# Patient Record
Sex: FEMALE | Race: BLACK OR AFRICAN AMERICAN | NOT HISPANIC OR LATINO | Employment: PART TIME | URBAN - METROPOLITAN AREA
[De-identification: names, ages, dates, MRNs, and addresses within clinical notes are randomized per-mention and may not be internally consistent; named-entity substitution may affect disease eponyms.]

---
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
## 2019-01-03 ENCOUNTER — ANESTHESIA EVENT (OUTPATIENT)
Dept: OBSTETRICS AND GYNECOLOGY | Facility: OTHER | Age: 19
End: 2019-01-03
Payer: MEDICAID

## 2019-01-03 ENCOUNTER — ROUTINE PRENATAL (OUTPATIENT)
Dept: OBSTETRICS AND GYNECOLOGY | Facility: CLINIC | Age: 19
End: 2019-01-03
Attending: OBSTETRICS & GYNECOLOGY
Payer: MEDICAID

## 2019-01-03 ENCOUNTER — HOSPITAL ENCOUNTER (INPATIENT)
Facility: OTHER | Age: 19
LOS: 3 days | Discharge: HOME | End: 2019-01-06
Attending: OBSTETRICS & GYNECOLOGY | Admitting: OBSTETRICS & GYNECOLOGY
Payer: MEDICAID

## 2019-01-03 ENCOUNTER — ANESTHESIA (OUTPATIENT)
Dept: OBSTETRICS AND GYNECOLOGY | Facility: OTHER | Age: 19
End: 2019-01-03
Payer: MEDICAID

## 2019-01-03 VITALS
DIASTOLIC BLOOD PRESSURE: 80 MMHG | SYSTOLIC BLOOD PRESSURE: 118 MMHG | BODY MASS INDEX: 25.28 KG/M2 | WEIGHT: 138.25 LBS

## 2019-01-03 DIAGNOSIS — B00.9 HSV-2 (HERPES SIMPLEX VIRUS 2) INFECTION: ICD-10-CM

## 2019-01-03 DIAGNOSIS — Z34.90 ENCOUNTER FOR INDUCTION OF LABOR: ICD-10-CM

## 2019-01-03 DIAGNOSIS — J45.909 ASTHMA, UNSPECIFIED ASTHMA SEVERITY, UNSPECIFIED WHETHER COMPLICATED, UNSPECIFIED WHETHER PERSISTENT: Primary | ICD-10-CM

## 2019-01-03 DIAGNOSIS — O99.013 ANEMIA AFFECTING PREGNANCY IN THIRD TRIMESTER: ICD-10-CM

## 2019-01-03 DIAGNOSIS — Z34.90 PREGNANCY WITH ONE FETUS, ANTEPARTUM: ICD-10-CM

## 2019-01-03 LAB
ABO + RH BLD: NORMAL
BASOPHILS # BLD AUTO: 0 K/UL
BASOPHILS NFR BLD: 0 %
BLD GP AB SCN CELLS X3 SERPL QL: NORMAL
DIFFERENTIAL METHOD: ABNORMAL
EOSINOPHIL # BLD AUTO: 0.1 K/UL
EOSINOPHIL NFR BLD: 0.8 %
ERYTHROCYTE [DISTWIDTH] IN BLOOD BY AUTOMATED COUNT: 16.5 %
HCT VFR BLD AUTO: 29.5 %
HGB BLD-MCNC: 9.5 G/DL
LYMPHOCYTES # BLD AUTO: 1.6 K/UL
LYMPHOCYTES NFR BLD: 26.3 %
MCH RBC QN AUTO: 26.5 PG
MCHC RBC AUTO-ENTMCNC: 32.2 G/DL
MCV RBC AUTO: 82 FL
MONOCYTES # BLD AUTO: 0.4 K/UL
MONOCYTES NFR BLD: 6.4 %
NEUTROPHILS # BLD AUTO: 4 K/UL
NEUTROPHILS NFR BLD: 66.2 %
PLATELET # BLD AUTO: 164 K/UL
PMV BLD AUTO: 10.9 FL
RBC # BLD AUTO: 3.58 M/UL
WBC # BLD AUTO: 6.09 K/UL

## 2019-01-03 PROCEDURE — 36415 COLL VENOUS BLD VENIPUNCTURE: CPT

## 2019-01-03 PROCEDURE — 99213 OFFICE O/P EST LOW 20 MIN: CPT | Mod: PBBFAC,TH,25 | Performed by: OBSTETRICS & GYNECOLOGY

## 2019-01-03 PROCEDURE — 99213 OFFICE O/P EST LOW 20 MIN: CPT | Mod: TH,S$PBB,, | Performed by: OBSTETRICS & GYNECOLOGY

## 2019-01-03 PROCEDURE — 86901 BLOOD TYPING SEROLOGIC RH(D): CPT

## 2019-01-03 PROCEDURE — 11000001 HC ACUTE MED/SURG PRIVATE ROOM

## 2019-01-03 PROCEDURE — 25000003 PHARM REV CODE 250: Performed by: STUDENT IN AN ORGANIZED HEALTH CARE EDUCATION/TRAINING PROGRAM

## 2019-01-03 PROCEDURE — 99999 PR PBB SHADOW E&M-EST. PATIENT-LVL III: ICD-10-PCS | Mod: PBBFAC,,, | Performed by: OBSTETRICS & GYNECOLOGY

## 2019-01-03 PROCEDURE — 85025 COMPLETE CBC W/AUTO DIFF WBC: CPT

## 2019-01-03 PROCEDURE — 99999 PR PBB SHADOW E&M-EST. PATIENT-LVL III: CPT | Mod: PBBFAC,,, | Performed by: OBSTETRICS & GYNECOLOGY

## 2019-01-03 PROCEDURE — 72100002 HC LABOR CARE, 1ST 8 HOURS

## 2019-01-03 PROCEDURE — 99213 PR OFFICE/OUTPT VISIT, EST, LEVL III, 20-29 MIN: ICD-10-PCS | Mod: TH,S$PBB,, | Performed by: OBSTETRICS & GYNECOLOGY

## 2019-01-03 RX ORDER — SODIUM CHLORIDE, SODIUM LACTATE, POTASSIUM CHLORIDE, CALCIUM CHLORIDE 600; 310; 30; 20 MG/100ML; MG/100ML; MG/100ML; MG/100ML
INJECTION, SOLUTION INTRAVENOUS CONTINUOUS
Status: DISCONTINUED | OUTPATIENT
Start: 2019-01-03 | End: 2019-01-04

## 2019-01-03 RX ORDER — OXYTOCIN/RINGER'S LACTATE 20/1000 ML
333 PLASTIC BAG, INJECTION (ML) INTRAVENOUS CONTINUOUS
Status: ACTIVE | OUTPATIENT
Start: 2019-01-03 | End: 2019-01-03

## 2019-01-03 RX ORDER — CARBOPROST TROMETHAMINE 250 UG/ML
250 INJECTION, SOLUTION INTRAMUSCULAR
Status: DISCONTINUED | OUTPATIENT
Start: 2019-01-03 | End: 2019-01-04

## 2019-01-03 RX ORDER — METHYLERGONOVINE MALEATE 0.2 MG/ML
200 INJECTION INTRAVENOUS
Status: DISCONTINUED | OUTPATIENT
Start: 2019-01-03 | End: 2019-01-04

## 2019-01-03 RX ORDER — CARBOPROST TROMETHAMINE 250 UG/ML
250 INJECTION, SOLUTION INTRAMUSCULAR
Status: CANCELLED | OUTPATIENT
Start: 2019-01-03

## 2019-01-03 RX ORDER — OXYTOCIN/RINGER'S LACTATE 20/1000 ML
10 PLASTIC BAG, INJECTION (ML) INTRAVENOUS
Status: DISCONTINUED | OUTPATIENT
Start: 2019-01-03 | End: 2019-01-04

## 2019-01-03 RX ORDER — SODIUM CHLORIDE 9 MG/ML
INJECTION, SOLUTION INTRAVENOUS
Status: DISCONTINUED | OUTPATIENT
Start: 2019-01-03 | End: 2019-01-04

## 2019-01-03 RX ORDER — ONDANSETRON 4 MG/1
8 TABLET, ORALLY DISINTEGRATING ORAL EVERY 8 HOURS PRN
Status: CANCELLED | OUTPATIENT
Start: 2019-01-03

## 2019-01-03 RX ORDER — MISOPROSTOL 100 UG/1
600 TABLET ORAL
Status: CANCELLED | OUTPATIENT
Start: 2019-01-03

## 2019-01-03 RX ORDER — SODIUM CHLORIDE, SODIUM LACTATE, POTASSIUM CHLORIDE, CALCIUM CHLORIDE 600; 310; 30; 20 MG/100ML; MG/100ML; MG/100ML; MG/100ML
INJECTION, SOLUTION INTRAVENOUS CONTINUOUS
Status: CANCELLED | OUTPATIENT
Start: 2019-01-03

## 2019-01-03 RX ORDER — OXYTOCIN/RINGER'S LACTATE 20/1000 ML
10 PLASTIC BAG, INJECTION (ML) INTRAVENOUS
Status: CANCELLED | OUTPATIENT
Start: 2019-01-03

## 2019-01-03 RX ORDER — OXYTOCIN/RINGER'S LACTATE 20/1000 ML
20 PLASTIC BAG, INJECTION (ML) INTRAVENOUS ONCE
Status: CANCELLED | OUTPATIENT
Start: 2019-01-03 | End: 2019-01-03

## 2019-01-03 RX ORDER — ONDANSETRON 8 MG/1
8 TABLET, ORALLY DISINTEGRATING ORAL EVERY 8 HOURS PRN
Status: DISCONTINUED | OUTPATIENT
Start: 2019-01-03 | End: 2019-01-04

## 2019-01-03 RX ORDER — METHYLERGONOVINE MALEATE 0.2 MG/ML
200 INJECTION INTRAVENOUS
Status: CANCELLED | OUTPATIENT
Start: 2019-01-03

## 2019-01-03 RX ORDER — OXYTOCIN/RINGER'S LACTATE 20/1000 ML
41.65 PLASTIC BAG, INJECTION (ML) INTRAVENOUS CONTINUOUS
Status: DISCONTINUED | OUTPATIENT
Start: 2019-01-03 | End: 2019-01-04

## 2019-01-03 RX ORDER — OXYTOCIN/RINGER'S LACTATE 20/1000 ML
41.7 PLASTIC BAG, INJECTION (ML) INTRAVENOUS CONTINUOUS
Status: DISCONTINUED | OUTPATIENT
Start: 2019-01-03 | End: 2019-01-04

## 2019-01-03 RX ORDER — MISOPROSTOL 100 MCG
50 TABLET ORAL ONCE
Status: CANCELLED | OUTPATIENT
Start: 2019-01-03

## 2019-01-03 RX ORDER — OXYTOCIN/RINGER'S LACTATE 20/1000 ML
20 PLASTIC BAG, INJECTION (ML) INTRAVENOUS ONCE
Status: DISCONTINUED | OUTPATIENT
Start: 2019-01-03 | End: 2019-01-04

## 2019-01-03 RX ORDER — SODIUM CHLORIDE 9 MG/ML
INJECTION, SOLUTION INTRAVENOUS
Status: CANCELLED | OUTPATIENT
Start: 2019-01-03

## 2019-01-03 RX ORDER — TERBUTALINE SULFATE 1 MG/ML
0.25 INJECTION SUBCUTANEOUS
Status: DISCONTINUED | OUTPATIENT
Start: 2019-01-03 | End: 2019-01-04

## 2019-01-03 RX ORDER — TERBUTALINE SULFATE 1 MG/ML
0.25 INJECTION SUBCUTANEOUS
Status: CANCELLED | OUTPATIENT
Start: 2019-01-03

## 2019-01-03 RX ORDER — OXYTOCIN/RINGER'S LACTATE 20/1000 ML
2 PLASTIC BAG, INJECTION (ML) INTRAVENOUS CONTINUOUS
Status: DISCONTINUED | OUTPATIENT
Start: 2019-01-03 | End: 2019-01-04

## 2019-01-03 RX ORDER — MISOPROSTOL 200 UG/1
600 TABLET ORAL
Status: DISCONTINUED | OUTPATIENT
Start: 2019-01-03 | End: 2019-01-04

## 2019-01-03 RX ADMIN — SODIUM CHLORIDE, SODIUM LACTATE, POTASSIUM CHLORIDE, AND CALCIUM CHLORIDE: .6; .31; .03; .02 INJECTION, SOLUTION INTRAVENOUS at 10:01

## 2019-01-03 RX ADMIN — MISOPROSTOL 50 MCG: 100 TABLET ORAL at 10:01

## 2019-01-03 NOTE — PROGRESS NOTES
Patient seen and examined.  No complaints, denies VB/LOF/ reoprts occasional Ctxns, reports good fetal movement. Precautions for Bleeding/ ROM/ Decreased fetal movement/ Pre-E reviewed.  Would like to proceed with induction, will come in tonight at 9pm, a;; instructions reviewed.

## 2019-01-04 LAB
ALBUMIN SERPL BCP-MCNC: 2.8 G/DL
ALP SERPL-CCNC: 221 U/L
ALT SERPL W/O P-5'-P-CCNC: 31 U/L
ANION GAP SERPL CALC-SCNC: 9 MMOL/L
AST SERPL-CCNC: 28 U/L
BASOPHILS # BLD AUTO: 0 K/UL
BASOPHILS NFR BLD: 0 %
BILIRUB SERPL-MCNC: 0.2 MG/DL
BUN SERPL-MCNC: 9 MG/DL
CALCIUM SERPL-MCNC: 9 MG/DL
CHLORIDE SERPL-SCNC: 106 MMOL/L
CO2 SERPL-SCNC: 22 MMOL/L
CREAT SERPL-MCNC: 0.6 MG/DL
CREAT UR-MCNC: 42.1 MG/DL
DIFFERENTIAL METHOD: ABNORMAL
EOSINOPHIL # BLD AUTO: 0.1 K/UL
EOSINOPHIL NFR BLD: 1.2 %
ERYTHROCYTE [DISTWIDTH] IN BLOOD BY AUTOMATED COUNT: 16.7 %
EST. GFR  (AFRICAN AMERICAN): >60 ML/MIN/1.73 M^2
EST. GFR  (NON AFRICAN AMERICAN): >60 ML/MIN/1.73 M^2
GLUCOSE SERPL-MCNC: 75 MG/DL
HCT VFR BLD AUTO: 32.9 %
HGB BLD-MCNC: 10.2 G/DL
LYMPHOCYTES # BLD AUTO: 2.6 K/UL
LYMPHOCYTES NFR BLD: 38.3 %
MCH RBC QN AUTO: 25.8 PG
MCHC RBC AUTO-ENTMCNC: 31 G/DL
MCV RBC AUTO: 83 FL
MONOCYTES # BLD AUTO: 0.5 K/UL
MONOCYTES NFR BLD: 6.6 %
NEUTROPHILS # BLD AUTO: 3.6 K/UL
NEUTROPHILS NFR BLD: 53.6 %
PLATELET # BLD AUTO: 172 K/UL
PMV BLD AUTO: 10.8 FL
POTASSIUM SERPL-SCNC: 4 MMOL/L
PROT SERPL-MCNC: 6.4 G/DL
PROT UR-MCNC: 55 MG/DL
PROT/CREAT UR: 1.31 MG/G{CREAT}
RBC # BLD AUTO: 3.95 M/UL
SODIUM SERPL-SCNC: 137 MMOL/L
WBC # BLD AUTO: 6.79 K/UL

## 2019-01-04 PROCEDURE — 72100003 HC LABOR CARE, EA. ADDL. 8 HRS

## 2019-01-04 PROCEDURE — 25000003 PHARM REV CODE 250: Performed by: STUDENT IN AN ORGANIZED HEALTH CARE EDUCATION/TRAINING PROGRAM

## 2019-01-04 PROCEDURE — 59409 PR OBSTETRICAL CARE,VAG DELIV ONLY: ICD-10-PCS | Mod: GB,,, | Performed by: OBSTETRICS & GYNECOLOGY

## 2019-01-04 PROCEDURE — 80053 COMPREHEN METABOLIC PANEL: CPT

## 2019-01-04 PROCEDURE — 51702 INSERT TEMP BLADDER CATH: CPT

## 2019-01-04 PROCEDURE — 85025 COMPLETE CBC W/AUTO DIFF WBC: CPT

## 2019-01-04 PROCEDURE — 62326 NJX INTERLAMINAR LMBR/SAC: CPT | Performed by: STUDENT IN AN ORGANIZED HEALTH CARE EDUCATION/TRAINING PROGRAM

## 2019-01-04 PROCEDURE — 27200710 HC EPIDURAL INFUSION PUMP SET: Performed by: STUDENT IN AN ORGANIZED HEALTH CARE EDUCATION/TRAINING PROGRAM

## 2019-01-04 PROCEDURE — 59409 OBSTETRICAL CARE: CPT | Mod: GB,,, | Performed by: OBSTETRICS & GYNECOLOGY

## 2019-01-04 PROCEDURE — 84156 ASSAY OF PROTEIN URINE: CPT

## 2019-01-04 PROCEDURE — 63600175 PHARM REV CODE 636 W HCPCS: Performed by: STUDENT IN AN ORGANIZED HEALTH CARE EDUCATION/TRAINING PROGRAM

## 2019-01-04 PROCEDURE — 72100002 HC LABOR CARE, 1ST 8 HOURS

## 2019-01-04 PROCEDURE — 72200006 HC VAGINAL DELIVERY LEVEL III

## 2019-01-04 PROCEDURE — 36415 COLL VENOUS BLD VENIPUNCTURE: CPT

## 2019-01-04 PROCEDURE — 27800517 HC TRAY,EPIDURAL-CONTINUOUS: Performed by: STUDENT IN AN ORGANIZED HEALTH CARE EDUCATION/TRAINING PROGRAM

## 2019-01-04 PROCEDURE — 11000001 HC ACUTE MED/SURG PRIVATE ROOM

## 2019-01-04 PROCEDURE — S0020 INJECTION, BUPIVICAINE HYDRO: HCPCS | Performed by: STUDENT IN AN ORGANIZED HEALTH CARE EDUCATION/TRAINING PROGRAM

## 2019-01-04 PROCEDURE — 62326 NJX INTERLAMINAR LMBR/SAC: CPT | Performed by: ANESTHESIOLOGY

## 2019-01-04 PROCEDURE — 59409 OBSTETRICAL CARE: CPT | Mod: AA,,, | Performed by: ANESTHESIOLOGY

## 2019-01-04 PROCEDURE — 59409 PRA ETRICAL CARE,VAG DELIV ONLY: ICD-10-PCS | Mod: AA,,, | Performed by: ANESTHESIOLOGY

## 2019-01-04 RX ORDER — HYDROCORTISONE 25 MG/G
CREAM TOPICAL 3 TIMES DAILY PRN
Status: DISCONTINUED | OUTPATIENT
Start: 2019-01-04 | End: 2019-01-06 | Stop reason: HOSPADM

## 2019-01-04 RX ORDER — LIDOCAINE HYDROCHLORIDE 10 MG/ML
INJECTION INFILTRATION; PERINEURAL
Status: DISCONTINUED
Start: 2019-01-04 | End: 2019-01-04 | Stop reason: WASHOUT

## 2019-01-04 RX ORDER — OXYTOCIN/RINGER'S LACTATE 20/1000 ML
41.65 PLASTIC BAG, INJECTION (ML) INTRAVENOUS CONTINUOUS
Status: ACTIVE | OUTPATIENT
Start: 2019-01-04 | End: 2019-01-05

## 2019-01-04 RX ORDER — ACETAMINOPHEN 325 MG/1
650 TABLET ORAL EVERY 6 HOURS PRN
Status: DISCONTINUED | OUTPATIENT
Start: 2019-01-04 | End: 2019-01-06 | Stop reason: HOSPADM

## 2019-01-04 RX ORDER — NALBUPHINE HYDROCHLORIDE 10 MG/ML
2.5 INJECTION, SOLUTION INTRAMUSCULAR; INTRAVENOUS; SUBCUTANEOUS ONCE
Status: DISCONTINUED | OUTPATIENT
Start: 2019-01-04 | End: 2019-01-04

## 2019-01-04 RX ORDER — CALCIUM GLUCONATE 98 MG/ML
1 INJECTION, SOLUTION INTRAVENOUS
Status: DISCONTINUED | OUTPATIENT
Start: 2019-01-04 | End: 2019-01-06 | Stop reason: HOSPADM

## 2019-01-04 RX ORDER — FENTANYL/BUPIVACAINE/NS/PF 2MCG/ML-.1
PLASTIC BAG, INJECTION (ML) INJECTION CONTINUOUS
Status: DISCONTINUED | OUTPATIENT
Start: 2019-01-04 | End: 2019-01-04

## 2019-01-04 RX ORDER — DOCUSATE SODIUM 100 MG/1
200 CAPSULE, LIQUID FILLED ORAL 2 TIMES DAILY PRN
Status: DISCONTINUED | OUTPATIENT
Start: 2019-01-04 | End: 2019-01-06

## 2019-01-04 RX ORDER — FENTANYL CITRATE 50 UG/ML
INJECTION, SOLUTION INTRAMUSCULAR; INTRAVENOUS
Status: DISPENSED
Start: 2019-01-04 | End: 2019-01-05

## 2019-01-04 RX ORDER — HYDROCODONE BITARTRATE AND ACETAMINOPHEN 5; 325 MG/1; MG/1
1 TABLET ORAL EVERY 4 HOURS PRN
Status: DISCONTINUED | OUTPATIENT
Start: 2019-01-04 | End: 2019-01-06 | Stop reason: HOSPADM

## 2019-01-04 RX ORDER — LABETALOL HCL 20 MG/4 ML
20 SYRINGE (ML) INTRAVENOUS ONCE
Status: COMPLETED | OUTPATIENT
Start: 2019-01-04 | End: 2019-01-04

## 2019-01-04 RX ORDER — IBUPROFEN 600 MG/1
600 TABLET ORAL EVERY 6 HOURS
Status: DISCONTINUED | OUTPATIENT
Start: 2019-01-05 | End: 2019-01-06 | Stop reason: HOSPADM

## 2019-01-04 RX ORDER — BUPIVACAINE HYDROCHLORIDE 2.5 MG/ML
INJECTION, SOLUTION INFILTRATION; PERINEURAL CONTINUOUS PRN
Status: DISCONTINUED | OUTPATIENT
Start: 2019-01-04 | End: 2019-01-04

## 2019-01-04 RX ORDER — SODIUM CHLORIDE, SODIUM LACTATE, POTASSIUM CHLORIDE, CALCIUM CHLORIDE 600; 310; 30; 20 MG/100ML; MG/100ML; MG/100ML; MG/100ML
1000 INJECTION, SOLUTION INTRAVENOUS CONTINUOUS
Status: DISCONTINUED | OUTPATIENT
Start: 2019-01-04 | End: 2019-01-05

## 2019-01-04 RX ORDER — ONDANSETRON 8 MG/1
8 TABLET, ORALLY DISINTEGRATING ORAL EVERY 8 HOURS PRN
Status: DISCONTINUED | OUTPATIENT
Start: 2019-01-04 | End: 2019-01-06 | Stop reason: HOSPADM

## 2019-01-04 RX ORDER — SODIUM CITRATE AND CITRIC ACID MONOHYDRATE 334; 500 MG/5ML; MG/5ML
30 SOLUTION ORAL ONCE
Status: DISCONTINUED | OUTPATIENT
Start: 2019-01-04 | End: 2019-01-04

## 2019-01-04 RX ORDER — FENTANYL/BUPIVACAINE/NS/PF 2MCG/ML-.1
PLASTIC BAG, INJECTION (ML) INJECTION
Status: DISPENSED
Start: 2019-01-04 | End: 2019-01-04

## 2019-01-04 RX ORDER — DIPHENHYDRAMINE HCL 25 MG
25 CAPSULE ORAL EVERY 4 HOURS PRN
Status: DISCONTINUED | OUTPATIENT
Start: 2019-01-04 | End: 2019-01-06 | Stop reason: HOSPADM

## 2019-01-04 RX ORDER — MAGNESIUM SULFATE HEPTAHYDRATE 40 MG/ML
4 INJECTION, SOLUTION INTRAVENOUS ONCE
Status: COMPLETED | OUTPATIENT
Start: 2019-01-04 | End: 2019-01-04

## 2019-01-04 RX ORDER — HYDROCODONE BITARTRATE AND ACETAMINOPHEN 10; 325 MG/1; MG/1
1 TABLET ORAL EVERY 4 HOURS PRN
Status: DISCONTINUED | OUTPATIENT
Start: 2019-01-04 | End: 2019-01-06 | Stop reason: HOSPADM

## 2019-01-04 RX ORDER — ONDANSETRON 2 MG/ML
4 INJECTION INTRAMUSCULAR; INTRAVENOUS ONCE
Status: DISCONTINUED | OUTPATIENT
Start: 2019-01-04 | End: 2019-01-04

## 2019-01-04 RX ORDER — FAMOTIDINE 10 MG/ML
20 INJECTION INTRAVENOUS ONCE
Status: DISCONTINUED | OUTPATIENT
Start: 2019-01-04 | End: 2019-01-04

## 2019-01-04 RX ORDER — DIPHENHYDRAMINE HYDROCHLORIDE 50 MG/ML
25 INJECTION INTRAMUSCULAR; INTRAVENOUS EVERY 4 HOURS PRN
Status: DISCONTINUED | OUTPATIENT
Start: 2019-01-04 | End: 2019-01-04

## 2019-01-04 RX ORDER — MAGNESIUM SULFATE HEPTAHYDRATE 40 MG/ML
2 INJECTION, SOLUTION INTRAVENOUS CONTINUOUS
Status: DISCONTINUED | OUTPATIENT
Start: 2019-01-04 | End: 2019-01-05

## 2019-01-04 RX ORDER — DIPHENHYDRAMINE HYDROCHLORIDE 50 MG/ML
12.5 INJECTION INTRAMUSCULAR; INTRAVENOUS EVERY 4 HOURS PRN
Status: DISCONTINUED | OUTPATIENT
Start: 2019-01-04 | End: 2019-01-04

## 2019-01-04 RX ORDER — BUPIVACAINE HYDROCHLORIDE 2.5 MG/ML
INJECTION, SOLUTION EPIDURAL; INFILTRATION; INTRACAUDAL
Status: DISPENSED
Start: 2019-01-04 | End: 2019-01-04

## 2019-01-04 RX ADMIN — LABETALOL HYDROCHLORIDE 20 MG: 5 INJECTION, SOLUTION INTRAVENOUS at 05:01

## 2019-01-04 RX ADMIN — MAGNESIUM SULFATE HEPTAHYDRATE 2 G/HR: 40 INJECTION, SOLUTION INTRAVENOUS at 11:01

## 2019-01-04 RX ADMIN — MAGNESIUM SULFATE HEPTAHYDRATE 4 G: 40 INJECTION, SOLUTION INTRAVENOUS at 05:01

## 2019-01-04 RX ADMIN — Medication 2 MILLI-UNITS/MIN: at 02:01

## 2019-01-04 RX ADMIN — MAGNESIUM SULFATE HEPTAHYDRATE 2 G/HR: 40 INJECTION, SOLUTION INTRAVENOUS at 05:01

## 2019-01-04 RX ADMIN — BUPIVACAINE HYDROCHLORIDE 5 ML/HR: 2.5 INJECTION, SOLUTION INFILTRATION; PERINEURAL at 08:01

## 2019-01-04 RX ADMIN — IBUPROFEN 600 MG: 600 TABLET ORAL at 11:01

## 2019-01-04 NOTE — PROGRESS NOTES
"LABOR PROGRESS NOTE/MAG NOTE    S: 18 y.o.  at 39w6d with EDC of 2019, by Last Menstrual Period, admitted for elective IOL however at 5AM developed severe range BP. She was diagnosed with preE w/ SF (BP) and started on magnesium. BP came down to normal range with labetalol 20 mg x1. Asymptomatic. She is continued on magnesium for seizure ppx. Labs obtained, as below.    Comfortable with epidural. Denies HA, vision changes, SOB, RUQ pain.    O:   /68   Pulse 92   Temp 98.2 °F (36.8 °C) (Temporal)   Resp 18   Ht 5' 2" (1.575 m)   Wt 62.7 kg (138 lb 3.7 oz)   LMP 2018   SpO2 98%   Breastfeeding? No   BMI 25.28 kg/m²     Gen: NAD, A&O x3  Resp: CTAB  Ext: TEDs/SCDs in place, DTRs 2+  FHT: 120, mod BTBV, + accels, - decels, Cat 1, reassuring  [place]/IUPC: Q2-3 min  SVE: 4/80/-2  IUPC placed    Recent Labs   Lab 19  2121 19  0440   WBC 6.09 6.79   HGB 9.5* 10.2*   HCT 29.5* 32.9*   MCV 82 83    172      Recent Labs   Lab 19  0440 19  0502     --    K 4.0  --      --    CO2 22*  --    BUN 9  --    CREATININE 0.6  --    GLU 75  --    PROT 6.4  --    BILITOT 0.2  --    ALKPHOS 221*  --    ALT 31  --    AST 28  --    UTPCR  --  1.31*          ASSESSMENT:  18 y.o.  at 39w6d with  Active Hospital Problems    Diagnosis  POA    *Encounter for induction of labor [Z34.90]  Not Applicable    Asthma- childhood [J45.909]  Yes    HSV-2 (herpes simplex virus 2) infection [B00.9]  Yes      Resolved Hospital Problems   No resolved problems to display.       PLAN:  Labor induction  Continue Close Maternal/Fetal Monitoring  Continue pitocin augmentation per protocol, titrate for goal MVUs >200  Recheck 2 hours or PRN    Labor course:   1000: 2/50/-3, cytotec x1  0200: 2/50/-2, pitocin at 2 mU/min  0430: 4/80/-2, AROM cl, pit at 2  0630: 4/80/-2, pit at 8 mU/min, IUPC placed    PreE w/ SF  S/p labetalol 20 mg x1, BP now normal  Asymptomatic  Continue magnesium " sulfate for seizure ppx  UOP adequate, 225 ml in past hr  Close monitoring of BP and UOP    Tyra Reynolds MD  OBGYN - PGY 4

## 2019-01-04 NOTE — PROGRESS NOTES
"LABOR PROGRESS NOTE/MAG NOTE    S: 18 y.o.  at 39w6d with EDC of 2019, by Last Menstrual Period, admitted for elective IOL however at 5AM developed severe range BP. She was diagnosed with preE w/ SF (BP) and started on magnesium. Patient is continued on magnesium at this time.     Comfortable with epidural. Denies HA, vision changes, SOB, RUQ pain.    O:   /68   Pulse 92   Temp 98.2 °F (36.8 °C) (Temporal)   Resp 18   Ht 5' 2" (1.575 m)   Wt 62.7 kg (138 lb 3.7 oz)   LMP 2018   SpO2 98%   Breastfeeding? No   BMI 25.28 kg/m²     Gen: NAD, A&O x3  Resp: CTAB  Ext: TEDs/SCDs in place, DTRs 2+   FHT: 120, mod BTBV, + accels, + intermittent variable decels, Cat 2, reassuring  [place]/IUPC: Q2-3 min  SVE: Anterior lip/100/0  IUPC in place    Recent Labs   Lab 19  0440   WBC 6.09 6.79   HGB 9.5* 10.2*   HCT 29.5* 32.9*   MCV 82 83    172      Recent Labs   Lab 19  0440 19  0502     --    K 4.0  --      --    CO2 22*  --    BUN 9  --    CREATININE 0.6  --    GLU 75  --    PROT 6.4  --    BILITOT 0.2  --    ALKPHOS 221*  --    ALT 31  --    AST 28  --    UTPCR  --  1.31*          ASSESSMENT:  18 y.o.  at 39w6d with  Active Hospital Problems    Diagnosis  POA    *Encounter for induction of labor [Z34.90]  Not Applicable    Asthma- childhood [J45.909]  Yes    HSV-2 (herpes simplex virus 2) infection [B00.9]  Yes      Resolved Hospital Problems   No resolved problems to display.       PLAN:  Labor induction  Continue Close Maternal/Fetal Monitoring  Continue pitocin augmentation per protocol, titrate for goal MVUs >200  Recheck 1 hour or PRN    Labor course:   1000: 2/50/-3, cytotec x1  0200: 2/50/-2, pitocin at 2 mU/min  0430: 4/80/-2, AROM cl, pit at 2  0630: 4/80/-2, pit at 8 mU/min, IUPC placed  1000: 6/90-1, pit at 8  1200: 7/90/-1, pit at 14, MVUs 168  1430: Anterior lip/100/0, labor down     PreE w/ SF  S/p labetalol 20 mg x1, BP " now normal  BP: (118-174)/(64-98) 124/68  Asymptomatic  Continue magnesium sulfate for seizure ppx  UOP adequate, 200 ml in past hr  Close monitoring of BP and UOP    Yolanda Green MD  OBGYN PGY-1

## 2019-01-04 NOTE — H&P
HISTORY AND PHYSICAL                                                OBSTETRICS          Subjective:       Isabela Frankel is a 18 y.o.  female with IUP at 39w5d weeks gestation here for elective induction of labor.    Patient denies contractions, denies vaginal bleeding, denies LOF.   Fetal Movement: normal.    This IUP is complicated by:  HSV: on valtrex ppx since 36 weeks  Childhood asthma: no current meds  Anemia: admit H/h 9.5/29.5/164  Adolescent pregnancy: 17 yoa today    Review of Systems   Constitutional: Negative for activity change, chills and fatigue.   Eyes: Negative for visual disturbance.   Respiratory: Negative for shortness of breath.    Cardiovascular: Negative for chest pain and palpitations.   Gastrointestinal: Negative for abdominal pain, constipation, diarrhea, nausea and vomiting.   Genitourinary: Negative for dysuria, vaginal bleeding, vaginal discharge, vaginal pain and vaginal odor.   Musculoskeletal: Negative for myalgias.   Neurological: Negative for headaches.   Psychiatric/Behavioral: Negative for depression.     PMHx:   Past Medical History:   Diagnosis Date    Asthma      PSHx:   Past Surgical History:   Procedure Laterality Date    DILATION AND CURETTAGE OF UTERUS  2017     All:   Review of patient's allergies indicates:   Allergen Reactions    Sulfa (sulfonamide antibiotics)      Meds:   Medications Prior to Admission   Medication Sig Dispense Refill Last Dose    valACYclovir (VALTREX) 1000 MG tablet Take 1 tablet (1,000 mg total) by mouth once daily. for 1 day 30 tablet 3 Taking     SH:   Social History     Socioeconomic History    Marital status: Single     Spouse name: Not on file    Number of children: Not on file    Years of education: Not on file    Highest education level: Not on file   Social Needs    Financial resource strain: Not on file    Food insecurity - worry: Not on file    Food insecurity - inability: Not on file    Transportation needs  "- medical: Not on file    Transportation needs - non-medical: Not on file   Occupational History    Not on file   Tobacco Use    Smoking status: Never Smoker    Smokeless tobacco: Never Used   Substance and Sexual Activity    Alcohol use: No    Drug use: No    Sexual activity: Yes     Partners: Male     Birth control/protection: Injection     Comment: Depo   Other Topics Concern    Not on file   Social History Narrative    Not on file     FH:   Family History   Problem Relation Age of Onset    Breast cancer Neg Hx     Cancer Neg Hx     Ovarian cancer Neg Hx     Colon cancer Neg Hx      OBHx:   Obstetric History       T0      L0     SAB0   TAB0   Ectopic0   Multiple0   Live Births0       # Outcome Date GA Lbr Jose/2nd Weight Sex Delivery Anes PTL Lv   1 Current                 Objective:       /76   Pulse 88   Temp 97.7 °F (36.5 °C) (Temporal)   Resp 18   Ht 5' 2" (1.575 m)   Wt 62.7 kg (138 lb 3.7 oz)   LMP 2018   SpO2 99%   Breastfeeding? No   BMI 25.28 kg/m²     Vitals:    19 2125 19 2130 19 2154   BP: 122/76     Pulse: 95 88    Resp: 18     Temp: 97.7 °F (36.5 °C)     TempSrc: Temporal     SpO2: 98% 99%    Weight:   62.7 kg (138 lb 3.7 oz)   Height:   5' 2" (1.575 m)       General:   alert, appears stated age and cooperative, no apparent distress   HENT:  normocephalic, atraumatic   Eyes:  extraocular movements and conjunctivae normal   Neck:  supple, range of motion normal, no thyromegaly   Lungs:   no respiratory distress   Heart:   regular rate   Abdomen:  soft, non-tender, non-distended but gravid, no rebound or guarding    Extremities negative edema, negative erythema   FHT: 130 bpm, moderate BTBV, ++accels, --decels;  Cat 1 (reassuring)                 TOCO: Q 10 minutes   Presentations: cephalic by ultrasound   Cervix:     Dilation: 2cm    Effacement: 50%    Station:  -3    Consistency: soft    Position: posterior   Sterile Speculum Exam: " negative for active HSV lesions, mild white discharge, physiologic mucus    EFW by Leopold's: 7    Lab Review  Blood Type O POS  GBBS: negative  Rubella: Immune  RPR: NR  HIV: positive  HepB: negative    Assessment:       39w5d weeks gestation here for elective IOL    Active Hospital Problems    Diagnosis  POA    Encounter for induction of labor [Z34.90]  Not Applicable      Resolved Hospital Problems   No resolved problems to display.      Plan:     Elective IOL:   - Risks, benefits, alternatives and possible complications have been discussed in detail with the patient.   - Consents signed and to chart  - Admit to Labor and Delivery unit  - cytotec > pit/AROM   - Epidural per Anesthesia  - Draw CBC, T&S  - Notify Staff  - Recheck in 4 hrs or PRN    Post-Partum Hemorrhage risk - low

## 2019-01-04 NOTE — MEDICAL/APP STUDENT
MAG NOTE    [unfilled] Isabela Frankel is a 18 female    Patient denies headaches, denies blurry vision and denies right upper quadrant pain. denies CP, denies SOB. Patient reports no nausea/no vomiting.     Objective:      [unfilled]@VS@  [unfilled]      [unfilled]    [unfilled]    [unfilled]      General:   alert, appears stated age and cooperative   Lungs:   clear to auscultation bilaterally   Heart:   regular rate and rhythm, S1, S2 normal, no murmur, click, rub or gallop   Abdomen:  soft, non-tender; bowel sounds normal; no masses,  no organomegaly   Uterine Size:  firm located at the umblicus.    Extremities: peripheral pulses normal, no pedal edema, no clubbing or cyanosis   Reflexes - 2+  bilaterally     SVE: ***/***/***   FHTs: 120, minimal variability, -accels/-deccels Cat 2, reassuring    Lab Review  @RESULTRCNT(48h)@        Assessment:     @AGE@ [unfilled] female , on magnesium sulfate    [unfilled]     Plan:   1. Continue magnesium sulfate, no signs of toxicity at this time  2. Monitor for s/s of eclampsia  3. Close maternal monitoring including UOP and BP, ***  4. Recheck in 2-4 hours or PRN  5. Intrapartum - ***

## 2019-01-04 NOTE — PROGRESS NOTES
"LABOR PROGRESS NOTE/MAG NOTE    S: 18 y.o.  at 39w6d with EDC of 2019, by Last Menstrual Period, admitted for elective IOL however at 5AM developed severe range BP. She was diagnosed with preE w/ SF (BP) and started on magnesium. Patient is continued on magnesium at this time.     Comfortable with epidural.    O:   /68   Pulse 92   Temp 98.2 °F (36.8 °C) (Temporal)   Resp 18   Ht 5' 2" (1.575 m)   Wt 62.7 kg (138 lb 3.7 oz)   LMP 2018   SpO2 98%   Breastfeeding? No   BMI 25.28 kg/m²     FHT: 120bpm, mod BTBV, no accels, early decels, reassuring  [place]/IUPC: Q2-3 min  SVE: 10/100/0, pushed past anterior lip  IUPC in place  140mVUs    Recent Labs   Lab 19  0440   WBC 6.09 6.79   HGB 9.5* 10.2*   HCT 29.5* 32.9*   MCV 82 83    172      Recent Labs   Lab 19  0440 19  0502     --    K 4.0  --      --    CO2 22*  --    BUN 9  --    CREATININE 0.6  --    GLU 75  --    PROT 6.4  --    BILITOT 0.2  --    ALKPHOS 221*  --    ALT 31  --    AST 28  --    UTPCR  --  1.31*          ASSESSMENT:  18 y.o.  at 39w6d with  Active Hospital Problems    Diagnosis  POA    *Encounter for induction of labor [Z34.90]  Not Applicable    Asthma- childhood [J45.909]  Yes    HSV-2 (herpes simplex virus 2) infection [B00.9]  Yes      Resolved Hospital Problems   No resolved problems to display.       PLAN:  Labor induction  Continue Close Maternal/Fetal Monitoring  Continue pitocin augmentation per protocol, titrate for goal MVUs >200  Set up to push  Dr. Li aware    Labor course:   1000: 2/50/-3, cytotec x1  0200: 2/50/-2, pitocin at 2 mU/min  0430: 4/80/-2, AROM cl, pit at 2  0630: 4/80/-2, pit at 8 mU/min, IUPC placed  1000: 6/90-1, pit at 8  1200: 7/90/-1, pit at 14, MVUs 168  1430: Anterior lip/100/0, labor down   1630: 10/100/0, pushed past anterior lip, set up to push    PreE w/ SF  S/p labetalol 20 mg x1, BP now normal  BP: (118-174)/(64-98) " 124/68  Asymptomatic  Continue magnesium sulfate for seizure ppx  Close monitoring of BP and UOP    Yolanda Jesus MD  PGY-4 OB/GYN  [phone]

## 2019-01-04 NOTE — ANESTHESIA PROCEDURE NOTES
Epidural    Patient location during procedure: OB   Reason for block: primary anesthetic   Post-op Pain Location: intrauterine pregnancy  Start time: 1/4/2019 4:15 AM  Timeout: 1/4/2019 4:15 AM  End time: 1/4/2019 4:24 AM  Staffing  Anesthesiologist: Tiffanie Sullivan MD  Resident/CRNA: Prabhu Kennedy MD  Performed: resident/CRNA   Preanesthetic Checklist  Completed: patient identified, site marked, surgical consent, pre-op evaluation, timeout performed, IV checked, risks and benefits discussed, monitors and equipment checked, anesthesia consent given, hand hygiene performed and patient being monitored  Preparation  Patient position: sitting  Prep: ChloraPrep  Patient monitoring: Pulse Ox and Blood Pressure  Epidural  Skin Anesthetic: lidocaine 1%  Skin Wheal: 3 mL  Administration type: single shot  Approach: midline  Interspace: L3-4  Injection technique: ALBERTO saline  Needle and Epidural Catheter  Needle type: Tuohy   Needle gauge: 17  Needle length: 3.5 inches  Needle insertion depth: 5 cm  Catheter type: springwound and multi-orifice  Catheter size: 19 G  Catheter at skin depth: 9 cm  Test dose: 3 mL of lidocaine 1.5% with Epi 1-to-200,000  Additional Documentation: incremental injection, no paresthesia on injection, no significant pain on injection, no significant complaints from patient, no signs/symptoms of IV or SA injection and negative aspiration for heme and CSF  Needle localization: anatomical landmarks

## 2019-01-04 NOTE — PROGRESS NOTES
"LABOR PROGRESS NOTE/MAG NOTE    S: 18 y.o.  at 39w6d with EDC of 2019, by Last Menstrual Period, admitted for elective IOL however at 5AM developed severe range BP. She was diagnosed with preE w/ SF (BP) and started on magnesium. Patient is continued on magnesium at this time.     Comfortable with epidural. Denies HA, vision changes, SOB, RUQ pain.    O:   /68   Pulse 92   Temp 98.2 °F (36.8 °C) (Temporal)   Resp 18   Ht 5' 2" (1.575 m)   Wt 62.7 kg (138 lb 3.7 oz)   LMP 2018   SpO2 98%   Breastfeeding? No   BMI 25.28 kg/m²     Gen: NAD, A&O x3  Resp: CTAB  Ext: TEDs/SCDs in place, DTRs 2+  FHT: 120, mod BTBV, + accels, + intermittent variable decels, Cat 2, reassuring  [place]/IUPC: Q2-3 min  SVE: 7/90/-1  IUPC in place    Recent Labs   Lab 19  0440   WBC 6.09 6.79   HGB 9.5* 10.2*   HCT 29.5* 32.9*   MCV 82 83    172      Recent Labs   Lab 19  0440 19  0502     --    K 4.0  --      --    CO2 22*  --    BUN 9  --    CREATININE 0.6  --    GLU 75  --    PROT 6.4  --    BILITOT 0.2  --    ALKPHOS 221*  --    ALT 31  --    AST 28  --    UTPCR  --  1.31*          ASSESSMENT:  18 y.o.  at 39w6d with  Active Hospital Problems    Diagnosis  POA    *Encounter for induction of labor [Z34.90]  Not Applicable    Asthma- childhood [J45.909]  Yes    HSV-2 (herpes simplex virus 2) infection [B00.9]  Yes      Resolved Hospital Problems   No resolved problems to display.       PLAN:  Labor induction  Continue Close Maternal/Fetal Monitoring  Continue pitocin augmentation per protocol, titrate for goal MVUs >200  Recheck 2 hours or PRN    Labor course:   1000: 2/50/-3, cytotec x1  0200: 2/50/-2, pitocin at 2 mU/min  0430: 4/80/-2, AROM cl, pit at 2  0630: 4/80/-2, pit at 8 mU/min, IUPC placed  1000: 6/90-1, pit at 8  1200: 7/90/-1, pit at 14, MVUs 168    PreE w/ SF  S/p labetalol 20 mg x1, BP now normal  BP: (118-174)/(64-98) " 124/68  Asymptomatic  Continue magnesium sulfate for seizure ppx  UOP adequate, 200 ml in past hr  Close monitoring of BP and UOP    Amelia Ivory MD  OBJANETT, PGY-1     n/a

## 2019-01-04 NOTE — PROGRESS NOTES
LABOR PROGRESS NOTE    S:  MD to bedside for cervical exam. Complaints: No.      O: Temp:  [97.7 °F (36.5 °C)] 97.7 °F (36.5 °C)  Pulse:  [88-95] 88  Resp:  [18] 18  SpO2:  [98 %-99 %] 99 %  BP: (118-122)/(76-80) 122/76      FHT: 140 BPM/moderate beat to beat variability/+accels/-decels Cat 1 (reassuring)  CTX: q 2-4 minutes, pitocin started @ 2 mU/min   SVE: /-2, intact    ASSESSMENT:   18 y.o.  at 39w6d, IOL for dates    FHT reassuring    Active Hospital Problems    Diagnosis  POA    *Encounter for induction of labor [Z34.90]  Not Applicable    Asthma- childhood [J45.909]  Yes    HSV-2 (herpes simplex virus 2) infection [B00.9]  Yes      Resolved Hospital Problems   No resolved problems to display.     PLAN:    Labor management  Continue Close Maternal/Fetal Monitoring.   Pitocin Augmentation per protocol, starting @ 2 mU/min  Recheck 2 hours or PRN  If unchanged next check will place Cook balloon    Labor course:   1000: 50/-3, cytotec x1  0200: 50/-2, pitocin @ 2 mU    Vivienne Guerra MD

## 2019-01-04 NOTE — PROGRESS NOTES
"LABOR PROGRESS NOTE/MAG NOTE    S: 18 y.o.  at 39w6d with EDC of 2019, by Last Menstrual Period, admitted for elective IOL however at 5AM developed severe range BP. She was diagnosed with preE w/ SF (BP) and started on magnesium. Patient is continued on magnesium at this time.     Comfortable with epidural. Denies HA, vision changes, SOB, RUQ pain.    O:   /68   Pulse 92   Temp 98.2 °F (36.8 °C) (Temporal)   Resp 18   Ht 5' 2" (1.575 m)   Wt 62.7 kg (138 lb 3.7 oz)   LMP 2018   SpO2 98%   Breastfeeding? No   BMI 25.28 kg/m²     Gen: NAD, A&O x3  Resp: CTAB  Ext: TEDs/SCDs in place, DTRs 2+  FHT: 120, mod BTBV, + accels, + intermittent variable decels, Cat 2, reassuring  [place]/IUPC: Q2-3 min  SVE: 6/90/-2  IUPC in place    Recent Labs   Lab 19  0440   WBC 6.09 6.79   HGB 9.5* 10.2*   HCT 29.5* 32.9*   MCV 82 83    172      Recent Labs   Lab 19  0440 19  0502     --    K 4.0  --      --    CO2 22*  --    BUN 9  --    CREATININE 0.6  --    GLU 75  --    PROT 6.4  --    BILITOT 0.2  --    ALKPHOS 221*  --    ALT 31  --    AST 28  --    UTPCR  --  1.31*          ASSESSMENT:  18 y.o.  at 39w6d with  Active Hospital Problems    Diagnosis  POA    *Encounter for induction of labor [Z34.90]  Not Applicable    Asthma- childhood [J45.909]  Yes    HSV-2 (herpes simplex virus 2) infection [B00.9]  Yes      Resolved Hospital Problems   No resolved problems to display.       PLAN:  Labor induction  Continue Close Maternal/Fetal Monitoring  Continue pitocin augmentation per protocol, titrate for goal MVUs >200  Recheck 2 hours or PRN    Labor course:   1000: 2/50/-3, cytotec x1  0200: 2/50/-2, pitocin at 2 mU/min  0430: 4/80/-2, AROM cl, pit at 2  0630: 4/80/-2, pit at 8 mU/min, IUPC placed  1000: 6/90-1, pit at 8    PreE w/ SF  S/p labetalol 20 mg x1, BP now normal  Asymptomatic  Continue magnesium sulfate for seizure ppx  UOP adequate, 225 " ml in past hr  Close monitoring of BP and UOP    Amelia Ivory MD  OBCHRISTIAN, PGY-1

## 2019-01-04 NOTE — ANESTHESIA PREPROCEDURE EVALUATION
[facility]  Anesthesia Pre-Operative Evaluation       Patient Name: Isabela Frankel  YOB: 2000  MRN: [id number]    SUBJECTIVE:     Isabela Frankel is a 18 y.o. female  at 39w5d presenting for IOL. Hx of childhood asthma; asymptomatic.    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      Denies hx of seizures, strokes, HTN, heart failure, smoking, COPD, acid reflux, liver disease, kidney disease, bleeding disorders, taking blood thinners, back surgery.  Lab Results   Component Value Date     2019       For this pregnancy, patient would like     Denies previous issues with neuraxial anesthesia.  Denies previous issues with general anesthesia.  Denies family history of issues with anesthesia.  Past Surgical History:   Procedure Laterality Date    DILATION AND CURETTAGE OF UTERUS  2017        OB History    Para Term  AB Living   1 0 0 0 0 0   SAB TAB Ectopic Multiple Live Births   0 0 0 0        # Outcome Date GA Lbr Jose/2nd Weight Sex Delivery Anes PTL Lv   1 Current                   Patient Active Problem List   Diagnosis    HSV-2 (herpes simplex virus 2) infection    Pregnancy with one fetus, antepartum    First pregnancy in adolescent 16 years of age or older in third trimester    Asthma- childhood    Anemia affecting pregnancy in third trimester    Encounter for induction of labor       Review of patient's allergies indicates:   Allergen Reactions    Sulfa (sulfonamide antibiotics)        Social History     Socioeconomic History    Marital status: Single     Spouse name: Not on file    Number of children: Not on file    Years of education: Not on file    Highest education level: Not on file   Social Needs    Financial resource strain: Not on file    Food insecurity - worry: Not on file    Food insecurity - inability: Not on file    Transportation needs - medical: Not on file    Transportation needs - non-medical: Not on file   Occupational History    Not on  file   Tobacco Use    Smoking status: Never Smoker    Smokeless tobacco: Never Used   Substance and Sexual Activity    Alcohol use: No    Drug use: No    Sexual activity: Yes     Partners: Male     Birth control/protection: Injection     Comment: Depo   Other Topics Concern    Not on file   Social History Narrative    Not on file       OBJECTIVE:     Outpatient Medications:  No current facility-administered medications on file prior to encounter.      Current Outpatient Medications on File Prior to Encounter   Medication Sig Dispense Refill    valACYclovir (VALTREX) 1000 MG tablet Take 1 tablet (1,000 mg total) by mouth once daily. for 1 day 30 tablet 3        Current Inpatient Medications:   miSOPROStol  50 mcg Oral Once    oxytocin in lactated ringers  20 Units Intravenous Once       Wt Readings from Last 1 Encounters:   01/03/19 2154 62.7 kg (138 lb 3.7 oz) (68 %, Z= 0.47)*     * Growth percentiles are based on [facility] (Girls, 2-20 Years) data.       BP Readings from Last 3 Encounters:   01/03/19 122/76   01/03/19 118/80   12/24/18 110/80         Chemistry        Component Value Date/Time     (L) 07/12/2018 1212    K 3.7 07/12/2018 1212     07/12/2018 1212    CO2 25 07/12/2018 1212    BUN 14 07/12/2018 1212    CREATININE 0.7 07/12/2018 1212    GLU 84 07/12/2018 1212        Component Value Date/Time    CALCIUM 10.8 (H) 07/12/2018 1212    ESTGFRAFRICA >60 07/12/2018 1212    EGFRNONAA >60 07/12/2018 1212            Lab Results   Component Value Date    WBC 6.09 01/03/2019    HGB 9.5 (L) 01/03/2019    HCT 29.5 (L) 01/03/2019    MCV 82 01/03/2019     01/03/2019       No results for input(s): PT, INR, PROTIME, APTT in the last 72 hours.      Anesthesia Evaluation    I have reviewed the Patient Summary Reports.    I have reviewed the Nursing Notes.   I have reviewed the Medications.     Review of Systems  Anesthesia Hx:  Denies Family Hx of Anesthesia complications.   Denies Personal Hx of  Anesthesia complications.   Hematology/Oncology:  Hematology Normal   Oncology Normal     EENT/Dental:EENT/Dental Normal   Cardiovascular:  Cardiovascular Normal     Pulmonary:   Asthma asymptomatic    Renal/:  Renal/ Normal     Hepatic/GI:  Hepatic/GI Normal    Musculoskeletal:  Musculoskeletal Normal    Neurological:  Neurology Normal    Endocrine:  Endocrine Normal    Dermatological:  Skin Normal    Psych:  Psychiatric Normal           Physical Exam  General:  Well nourished    Airway/Jaw/Neck:  Airway Findings: Mouth Opening: Normal Tongue: Normal  General Airway Assessment: Adult  Mallampati: II  TM Distance: Normal, at least 6 cm  Jaw/Neck Findings:  Neck ROM: Normal ROM  Neck Findings: Normal    Eyes/Ears/Nose:  EYES/EARS/NOSE FINDINGS: Normal   Dental:  Dental Findings: In tact   Chest/Lungs:  Chest/Lungs Findings: Normal Respiratory Rate     Heart/Vascular:  Heart Findings: Rate: Normal  Rhythm: Regular Rhythm        Mental Status:  Mental Status Findings: Normal        Anesthesia Plan  Type of Anesthesia, risks & benefits discussed:  Anesthesia Type:  general, CSE, epidural, spinal  Patient's Preference:   Intra-op Monitoring Plan: standard ASA monitors  Intra-op Monitoring Plan Comments:   Post Op Pain Control Plan: multimodal analgesia, IV/PO Opioids PRN and per primary service following discharge from PACU  Post Op Pain Control Plan Comments:   Induction:   IV  Beta Blocker:  Patient is not currently on a Beta-Blocker (No further documentation required).       Informed Consent: Patient understands risks and agrees with Anesthesia plan.  Questions answered. Anesthesia consent signed with patient.  ASA Score: 2     Day of Surgery Review of History & Physical:    H&P update referred to the surgeon.         Ready For Surgery From Anesthesia Perspective.

## 2019-01-04 NOTE — PROGRESS NOTES
LABOR PROGRESS NOTE    S:  MD to bedside for cervical exam. Complaints: No.      O: Temp:  [97.7 °F (36.5 °C)] 97.7 °F (36.5 °C)  Pulse:  [73-95] 76  Resp:  [18] 18  SpO2:  [95 %-100 %] 100 %  BP: (118-169)/(69-98) 158/91    FHT: 130 BPM/moderate beat to beat variability/+accels/-decels Cat 1 (reassuring)  CTX: q 2 minutes, pitocin started @ 2 mU/min   SVE: /-2, AROM cl 0445    ASSESSMENT:   18 y.o.  at 39w6d, IOL for dates    FHT reassuring    Active Hospital Problems    Diagnosis  POA    *Encounter for induction of labor [Z34.90]  Not Applicable    Asthma- childhood [J45.909]  Yes    HSV-2 (herpes simplex virus 2) infection [B00.9]  Yes      Resolved Hospital Problems   No resolved problems to display.     PLAN:    Labor management  Continue Close Maternal/Fetal Monitoring.   Continue pitocin Augmentation per protocol  Recheck 2 hours or PRN    Labor course:   1000: 250/-3, cytotec x1  0200: 50/-2, pitocin @ 2 mU  0430: 80/-2, AROM cl, pit @ 2    Homar Saeed MD

## 2019-01-05 LAB
BASOPHILS # BLD AUTO: 0.01 K/UL
BASOPHILS NFR BLD: 0.1 %
DIFFERENTIAL METHOD: ABNORMAL
EOSINOPHIL # BLD AUTO: 0.1 K/UL
EOSINOPHIL NFR BLD: 0.6 %
ERYTHROCYTE [DISTWIDTH] IN BLOOD BY AUTOMATED COUNT: 17 %
HCT VFR BLD AUTO: 28 %
HGB BLD-MCNC: 9 G/DL
LYMPHOCYTES # BLD AUTO: 1.7 K/UL
LYMPHOCYTES NFR BLD: 15.6 %
MAGNESIUM SERPL-MCNC: 6.6 MG/DL
MCH RBC QN AUTO: 26.3 PG
MCHC RBC AUTO-ENTMCNC: 32.1 G/DL
MCV RBC AUTO: 82 FL
MONOCYTES # BLD AUTO: 0.7 K/UL
MONOCYTES NFR BLD: 6.8 %
NEUTROPHILS # BLD AUTO: 8.4 K/UL
NEUTROPHILS NFR BLD: 76.7 %
PLATELET # BLD AUTO: 158 K/UL
PMV BLD AUTO: 10.8 FL
RBC # BLD AUTO: 3.42 M/UL
WBC # BLD AUTO: 10.91 K/UL

## 2019-01-05 PROCEDURE — 99232 PR SUBSEQUENT HOSPITAL CARE,LEVL II: ICD-10-PCS | Mod: ,,, | Performed by: OBSTETRICS & GYNECOLOGY

## 2019-01-05 PROCEDURE — 85025 COMPLETE CBC W/AUTO DIFF WBC: CPT

## 2019-01-05 PROCEDURE — 25000003 PHARM REV CODE 250: Performed by: STUDENT IN AN ORGANIZED HEALTH CARE EDUCATION/TRAINING PROGRAM

## 2019-01-05 PROCEDURE — 99232 SBSQ HOSP IP/OBS MODERATE 35: CPT | Mod: ,,, | Performed by: OBSTETRICS & GYNECOLOGY

## 2019-01-05 PROCEDURE — 11000001 HC ACUTE MED/SURG PRIVATE ROOM

## 2019-01-05 PROCEDURE — 36415 COLL VENOUS BLD VENIPUNCTURE: CPT

## 2019-01-05 PROCEDURE — 83735 ASSAY OF MAGNESIUM: CPT

## 2019-01-05 RX ADMIN — IBUPROFEN 600 MG: 600 TABLET ORAL at 05:01

## 2019-01-05 RX ADMIN — IBUPROFEN 600 MG: 600 TABLET ORAL at 11:01

## 2019-01-05 RX ADMIN — IBUPROFEN 600 MG: 600 TABLET ORAL at 06:01

## 2019-01-05 NOTE — PROGRESS NOTES
POSTPARTUM PROGRESS NOTE     Isabela Frankel is a 18 y.o. female PPD #1 status post Spontaneous vaginal delivery at 39w6d in a pregnancy complicated by pre-e with SF, HSV-2. Patient is doing well this morning. She denies nausea, vomiting, fever or chills.  Patient reports mild abdominal pain that is well relieved by oral pain medications. Lochia is mild and stable. Patient is voiding without difficulty and ambulating with no difficulty.   Denies sx of pre-eclampsia - HA, SOB, RUQ pain, visual changes  Patient does plan to breast feed. Primary OB for contraception. She desires circumcision.    Objective:       Temp:  [97.6 °F (36.4 °C)-98.9 °F (37.2 °C)] 98.1 °F (36.7 °C)  Pulse:  [] 96  Resp:  [18] 18  SpO2:  [76 %-100 %] 100 %  BP: (109-149)/(61-94) 115/67    General:   alert, appears stated age and cooperative   Lungs:   clear to auscultation bilaterally   Heart:   regular rate and rhythm, S1, S2 normal, no murmur, click, rub or gallop   Abdomen:  soft, non-tender; bowel sounds normal; no masses,  no organomegaly   Uterus:  firm located at the umblicus.        Extremities: peripheral pulses normal, no pedal edema, no clubbing or cyanosis     Lab Review  Recent Results (from the past 4 hour(s))   CBC auto differential    Collection Time: 01/05/19  4:51 AM   Result Value Ref Range    WBC 10.91 3.90 - 12.70 K/uL    RBC 3.42 (L) 4.00 - 5.40 M/uL    Hemoglobin 9.0 (L) 12.0 - 16.0 g/dL    Hematocrit 28.0 (L) 37.0 - 48.5 %    MCV 82 82 - 98 fL    MCH 26.3 (L) 27.0 - 31.0 pg    MCHC 32.1 32.0 - 36.0 g/dL    RDW 17.0 (H) 11.5 - 14.5 %    Platelets 158 150 - 350 K/uL    MPV 10.8 9.2 - 12.9 fL    Gran # (ANC) 8.4 (H) 1.8 - 7.7 K/uL    Lymph # 1.7 1.0 - 4.8 K/uL    Mono # 0.7 0.3 - 1.0 K/uL    Eos # 0.1 0.0 - 0.5 K/uL    Baso # 0.01 0.00 - 0.20 K/uL    Gran% 76.7 (H) 38.0 - 73.0 %    Lymph% 15.6 (L) 18.0 - 48.0 %    Mono% 6.8 4.0 - 15.0 %    Eosinophil% 0.6 0.0 - 8.0 %    Basophil% 0.1 0.0 - 1.9 %    Differential  Method Automated        I/O    Intake/Output Summary (Last 24 hours) at 1/5/2019 0721  Last data filed at 1/5/2019 0600  Gross per 24 hour   Intake 2655.5 ml   Output 4285 ml   Net -1629.5 ml        Assessment:     Patient Active Problem List   Diagnosis    HSV-2 (herpes simplex virus 2) infection    Pregnancy with one fetus, antepartum    First pregnancy in adolescent 16 years of age or older in third trimester    Asthma- childhood    Anemia affecting pregnancy in third trimester    Encounter for induction of labor        Plan:   1. Postpartum care:  - Patient doing well. Continue routine management and advances.  - Continue PO pain meds. Pain well controlled.  - Heme: H/h   Lab Results   Component Value Date    WBC 10.91 01/05/2019    HGB 9.0 (L) 01/05/2019    HCT 28.0 (L) 01/05/2019    MCV 82 01/05/2019     01/05/2019     - Encourage ambulation  - Circumcision orders placed  - Contraception defer to primary OB  - Lactation  PRN lactation consult     2. Pre-E  - BP: (109-149)/(61-94) 115/67  - Asymptomatic, s/pmag    3. Anemia  - VSS, Asymptomatic  - Fe/Colace      ITALO Mack

## 2019-01-05 NOTE — L&D DELIVERY NOTE
[facility]  Vaginal Delivery   Operative Note    SUMMARY     Normal spontaneous vaginal delivery of live infant, was placed on mothers abdomen for skin to skin and bulb suctioning performed.  Infant delivered position OA over intact perineum.  Nuchal cord: No.    Spontaneous delivery of placenta and IV pitocin given noting good uterine tone.  1st degree laceration noted and repaired in the usual fashion using 2-0 vicryl. bilateral periurethral tears repaired with 3-0 vicryl..  Patient tolerated delivery well. Sponge needle and lap counted correctly x2.    Indications: Encounter for induction of labor  Pregnancy complicated by:   Patient Active Problem List   Diagnosis    HSV-2 (herpes simplex virus 2) infection    Pregnancy with one fetus, antepartum    First pregnancy in adolescent 16 years of age or older in third trimester    Asthma- childhood    Anemia affecting pregnancy in third trimester    Encounter for induction of labor     Admitting GA: 39w6d    Delivery Information for  Robbie Frankel    Birth information:  YOB: 2020   Time of birth: 5:31 PM   Sex: male   Head Delivery Date/Time: 1/4/2019  5:31 PM   Delivery type: Vaginal, Spontaneous   Gestational Age: 39w6d    Delivery Providers    Delivering clinician:  Temitope Li DO   Provider Role    MD Anupama Crane, RN     Kallie Fish, RN             Measurements    Weight:  2370 g  Length:  48.9 cm  Head circumference:  33 cm  Chest circumference:  28.6 cm         Apgars    Living status:  Living  Apgars:   1 min.:   5 min.:   10 min.:   15 min.:   20 min.:     Skin color:   1  1       Heart rate:   2  2       Reflex irritability:   2  2       Muscle tone:   2  2       Respiratory effort:   2  2       Total:   9  9              Operative Delivery    Forceps attempted?:  No  Vacuum extractor attempted?:  No         Shoulder Dystocia    Shoulder dystocia present?:   No           Presentation    Presentation:  Vertex  Position:  Right Occiput Anterior           Interventions/Resuscitation    Method:  Tactile Stimulation, Bulb Suctioning       Cord    Vessels:  3 vessels  Complications:  None  Delayed Cord Clamping?:  Yes  Cord Clamped Date/Time:  2019  5:32 PM  Cord Blood Disposition:  Sent with Baby  Gases Sent?:  No       Placenta    Placenta delivery date/time:  2019 1734  Placenta removal:  Spontaneous  Placenta appearance:  Intact  Placenta disposition:  discarded           Labor Events:       labor: No     Labor Onset Date/Time:         Dilation Complete Date/Time:         Start Pushing Date/Time:       Rupture Date/Time: 19  0448         Rupture type:           Fluid Amount:        Fluid Color:        Fluid Odor:        Membrane Status (PeriCalm): ARM (Artificial Rupture)      Rupture Date/Time (PeriCalm): 2019 04:48:00      Fluid Amount (PeriCalm): Small      Fluid Color (PeriCalm): Clear       steroids:       Antibiotics given for GBS:       Induction:       Indications for induction:        Augmentation:       Indications for augmentation:       Labor complications: None     Additional complications:          Cervical ripening:                     Delivery:      Episiotomy:       Indication for Episiotomy:       Perineal Lacerations: 1st Repaired:      Periurethral Laceration: midline Repaired:     Labial Laceration:   Repaired:     Sulcus Laceration:   Repaired:     Vaginal Laceration:   Repaired:     Cervical Laceration:   Repaired:     Repair suture:       Repair # of packets: 2     Vaginal delivery QBL (mL): 250      QBL (mL): 0     Combined Blood Loss (mL): 250     Vaginal Sweep Performed: Yes     Surgicount Correct:         Other providers:            Details (if applicable):  Trial of Labor      Categorization:      Priority:     Indications for :     Incision Type:       Additional   information:  Forceps:    Vacuum:    Breech:    Observed anomalies    Other (Comments):         Jose M Jeronimo MD  PGY2, OBGYN [facility]

## 2019-01-05 NOTE — PROGRESS NOTES
Notified Dr Jeronimo about pt heart rate running 112-130. Pt not complaining of any other symptoms at this time. MD stated to just keep a close eye on pt.

## 2019-01-05 NOTE — ANESTHESIA POSTPROCEDURE EVALUATION
"Anesthesia Post Evaluation    Patient: Isabela Frankel    Procedure(s) Performed: * No procedures listed *    Final Anesthesia Type: epidural  Patient location during evaluation: labor & delivery  Patient participation: Yes- Able to Participate  Level of consciousness: awake and alert  Post-procedure vital signs: reviewed and stable  Pain management: adequate  Airway patency: patent  PONV status at discharge: No PONV  Anesthetic complications: no      Cardiovascular status: blood pressure returned to baseline, hemodynamically stable and stable  Respiratory status: unassisted  Hydration status: euvolemic  Follow-up not needed.        Visit Vitals  /69   Pulse 94   Temp 36.8 °C (98.3 °F)   Resp 18   Ht 5' 2" (1.575 m)   Wt 62.7 kg (138 lb 3.7 oz)   LMP 03/31/2018   SpO2 98%   Breastfeeding? Yes   BMI 25.28 kg/m²       Pain/Ramo Score: Pain Rating Prior to Med Admin: 0 (1/5/2019 11:28 AM)  Pain Rating Post Med Admin: 0 (1/5/2019 12:38 PM)        "

## 2019-01-05 NOTE — DISCHARGE INSTRUCTIONS
Breastfeeding Discharge Instructions       Feed the baby at the earliest sign of hunger or comfort  o Hands to mouth, sucking motions  o Rooting or searching for something to suck on  o Dont wait for crying - it is a sign of distress     The feedings may be 8-12 times per 24hrs and will not follow a schedule   Avoid pacifiers and bottles for the first 4 weeks   Alternate the breast you start the feeding with, or start with the breast that feels the fullest   Switch breasts when the baby takes himself off the breast or falls asleep   Keep offering breasts until the baby looks full, no longer gives hunger signs, and stays asleep when placed on his back in the crib   If the baby is sleepy and wont wake for a feeding, put the baby skin-to-skin dressed in a diaper against the mothers bare chest   Sleep near your baby   The baby should be positioned and latched on to the breast correctly  o Chest-to-chest, chin in the breast  o Babys lips are flipped outward  o Babys mouth is stretched open wide like a shout  o Babys sucking should feel like tugging to the mother  - The baby should be drinking at the breast:  o You should hear swallowing or gulping throughout the feeding  o You should see milk on the babys lips when he comes off the breast  o Your breasts should be softer when the baby is finished feeding  o The baby should look relaxed at the end of feedings  o After the 4th day and your milk is in:  o The babys poop should turn bright yellow and be loose, watery, and seedy  o The baby should have at least 3-4 poops the size of the palm of your hand per day  o The baby should have at least 5-6 wet diapers per day  o The urine should be light yellow in color  You should drink when you are thirsty and eat a healthy diet when you are    hungry.     Take naps to get the rest you need.   Take medications and/or drink alcohol only with permission of your obstetrician    or the babys pediatrician.  You can  also call the Infant Risk Center,   ([phone]), Monday-Friday, 8am-5pm Central time, to get the most   up-to-date evidence-based information on the use of medications during   pregnancy and breastfeeding.      The baby should be examined by a pediatrician at 3-5 days of age.  Once your   milk comes in, the baby should be gaining at least ½ - 1oz each day and should be back to birthweight no later than 10-14 days of age.          Community Resources    [facility] Breastfeeding Warmline: [phone]   Local [facility] clinics: provide incentives and breastpumps to eligible mothers  [employer] (LLLI):  mother-to-mother support group website        www.CommProvel.[employer]  Local La Leche League mother-to-mother support groups:        www.Huayi        [employer] Leche League [facility]   [employer]. [employer] website for latch videos and general information:        www.breastfeedinginc.ca  [facility] Risk [facility] is a call center that provides information about the safety of taking medications while breastfeeding.  Call 1-920.377.8446, M-F, 8am-5pm, CT.  International Lactation Consultant Association provides resources for assistance:        www.ilca.org  [place] Breastfeeding Coalition provides informationand resources for parents  and the community    www.LaBreastfeedingSupport.org     Ariane Flores is a mom-to-mom support group:                             www.[employer]com//breastfeedng-support/  [employer] for Healthy Babies:  8-547-977-BABY(4951)  [employer]: a breastfeeding support group for women of color, 324.476.3734

## 2019-01-06 VITALS
RESPIRATION RATE: 18 BRPM | HEIGHT: 62 IN | WEIGHT: 138.25 LBS | SYSTOLIC BLOOD PRESSURE: 121 MMHG | HEART RATE: 109 BPM | OXYGEN SATURATION: 98 % | BODY MASS INDEX: 25.44 KG/M2 | TEMPERATURE: 99 F | DIASTOLIC BLOOD PRESSURE: 80 MMHG

## 2019-01-06 PROCEDURE — 25000003 PHARM REV CODE 250: Performed by: STUDENT IN AN ORGANIZED HEALTH CARE EDUCATION/TRAINING PROGRAM

## 2019-01-06 PROCEDURE — 99238 HOSP IP/OBS DSCHRG MGMT 30/<: CPT | Mod: ,,, | Performed by: OBSTETRICS & GYNECOLOGY

## 2019-01-06 PROCEDURE — 99238 PR HOSPITAL DISCHARGE DAY,<30 MIN: ICD-10-PCS | Mod: ,,, | Performed by: OBSTETRICS & GYNECOLOGY

## 2019-01-06 PROCEDURE — 72100003 HC LABOR CARE, EA. ADDL. 8 HRS

## 2019-01-06 RX ORDER — IBUPROFEN 600 MG/1
600 TABLET ORAL EVERY 6 HOURS
Qty: 30 TABLET | Refills: 1 | Status: SHIPPED | OUTPATIENT
Start: 2019-01-06 | End: 2019-02-20

## 2019-01-06 RX ORDER — FERROUS SULFATE 325(65) MG
325 TABLET ORAL
Refills: 0 | COMMUNITY
Start: 2019-01-06 | End: 2019-02-20

## 2019-01-06 RX ORDER — DOCUSATE SODIUM 100 MG/1
200 CAPSULE, LIQUID FILLED ORAL 2 TIMES DAILY
Status: DISCONTINUED | OUTPATIENT
Start: 2019-01-06 | End: 2019-01-06 | Stop reason: HOSPADM

## 2019-01-06 RX ORDER — IRON POLYSACCHARIDE COMPLEX 150 MG
150 CAPSULE ORAL DAILY
Status: DISCONTINUED | OUTPATIENT
Start: 2019-01-06 | End: 2019-01-06 | Stop reason: HOSPADM

## 2019-01-06 RX ADMIN — Medication 150 MG: at 09:01

## 2019-01-06 RX ADMIN — DOCUSATE SODIUM 200 MG: 100 CAPSULE, LIQUID FILLED ORAL at 09:01

## 2019-01-06 RX ADMIN — IBUPROFEN 600 MG: 600 TABLET ORAL at 11:01

## 2019-01-06 RX ADMIN — IBUPROFEN 600 MG: 600 TABLET ORAL at 06:01

## 2019-01-06 NOTE — LACTATION NOTE
01/06/19 1015   (RETIRED) Maternal Infant Assessment   Breast Shape Right:;round   Breast Density Right:;filling   Areola Right:;elastic   Nipples Right:;everted   LATCH Score   Latch 2-->grasps breast, tongue down, lips flanged, rhythmic sucking   Audible Swallowing 2-->spontaneous and intermittent (24 hrs old)   Type of Nipple 2-->everted (after stimulation)   Comfort (Breast/Nipple) 1-->filling, red/small blisters/bruises, mild/mod discomfort   Hold (Positioning) 1-->minimal assist, teach one side, mother does other, staff holds   Score 8   (RETIRED) Maternal Infant Feeding   Maternal Emotional State assist needed   Infant Positioning clutch/football   Signs of Milk Transfer audible swallow;breasts soften with feeding;infant jaw motion present   Pain with Feeding no   Time Spent (min) 15-30 min   (RETIRED) Infant First Feeding   Skin-to-Skin Contact Initiated   (RETIRED) Feeding Infant   Feeding Readiness Cues crying   Effective Latch During Feeding yes   (RETIRED) Lactation Referrals   Lactation Consult Breastfeeding assessment;Follow up   Lactation Interventions   Breastfeeding Assistance assisted with positioning;infant latch-on verified;infant suck/swallow verified

## 2019-01-06 NOTE — NURSING
Dr. Jeronimo notified that RN does not have access to setting appointments for patient. MD gave telephone order to ask patient to make her own appointment with walk in clinic for 1 week after discharge.

## 2019-01-06 NOTE — NURSING
DC instructions given to patient, questions answered. No distress noted. Discharge home. [facility].

## 2019-01-06 NOTE — PLAN OF CARE
Problem: Breastfeeding  Goal: Effective Breastfeeding  Outcome: Ongoing (interventions implemented as appropriate)  Lactation note:  Reviewed lactation discharge teaching with patient using the breastfeeding guide. The patient was able to latch her infant to the breast independently once LC provided verbal cues for positioning and infant was nursing effectively. Patient's breasts filling. Encouraged nursing infant 8 or more times in 24 hours on cue until content, waking to feed at least every 3 hours due to small infant size. We discussed prevention and treatment of sore nipples and breast engorgement. The patient has lanolin to use as needed after nursing. The patient was taught how to perform hand expression and she has a breast pump to use as needed. The patient has the lactation phone number to call as needed. Additional resources were placed on her AVS to be given at discharge.

## 2019-01-06 NOTE — DISCHARGE SUMMARY
Delivery Discharge Summary  Obstetrics      Primary OB Clinician: Temitope Li DO    Admission date: 1/3/2019  Discharge date: 2019    Disposition: To home, self care    Discharge Diagnosis List:      Patient Active Problem List   Diagnosis    HSV-2 (herpes simplex virus 2) infection    Pregnancy with one fetus, antepartum    First pregnancy in adolescent 16 years of age or older in third trimester    Asthma- childhood    Anemia affecting pregnancy in third trimester    Encounter for induction of labor       Procedure:     Hospital Course:  Isabela Frankel is a 18 y.o. now , PPD #2 who was admitted on 1/3/2019 at 39w6d for IOL. Patient was subsequently admitted to labor and delivery unit with signed consents.     Labor course was complicated by preE w SF (severe range blood pressure) and patient was placed on Mag during labor and for 24 hours postpartum. Her blood pressures were well controlled after delivery without medications.    Please see delivery note for further details. Her postpartum course was uncomplicated. On discharge day, patient's pain is controlled with oral pain medications. Pt is tolerating ambulation without SOB or CP, and regular diet without N/V. Reports lochia is mild. Denies any HA, vision changes, F/C, LE swelling. Denies any breast pain/soreness.    Pt in stable condition and ready for discharge. She has been instructed to start and/or continue medications and follow up with her obstetrics provider as listed below.    Pertinent studies:  CBC  Recent Labs   Lab 19  2121 19  0440 19  0451   WBC 6.09 6.79 10.91   HGB 9.5* 10.2* 9.0*   HCT 29.5* 32.9* 28.0*   MCV 82 83 82    172 158      Immunization History   Administered Date(s) Administered    Influenza - Quadrivalent - PF 2018    Tdap 2018        Delivery:    Episiotomy:     Lacerations: 1st   Repair suture:     Repair # of packets: 2   Blood loss (ml): 250     Birth  information:  YOB: 2020   Time of birth: 5:31 PM   Sex: male   Delivery type: Vaginal, Spontaneous   Gestational Age: 39w6d    Delivery Clinician:      Other providers:       Additional  information:  Forceps:    Vacuum:    Breech:    Observed anomalies      Living?:           APGARS  One minute Five minutes Ten minutes   Skin color:         Heart rate:         Grimace:         Muscle tone:         Breathing:         Totals: 9  9        Placenta: Delivered:       appearance      Patient Instructions:   Current Discharge Medication List      START taking these medications    Details   ferrous sulfate (FEOSOL) 325 mg (65 mg iron) Tab tablet Take 1 tablet (325 mg total) by mouth daily with breakfast.  Refills: 0      ibuprofen (ADVIL,MOTRIN) 600 MG tablet Take 1 tablet (600 mg total) by mouth every 6 (six) hours.  Qty: 30 tablet, Refills: 1         STOP taking these medications       valACYclovir (VALTREX) 1000 MG tablet Comments:   Reason for Stopping:               Discharge Procedure Orders   Diet Adult Regular     Other restrictions (specify):   Order Comments: Nothing in the vagina for 6 weeks     Notify your health care provider if you experience any of the following:  temperature >100.4     Notify your health care provider if you experience any of the following:  persistent nausea and vomiting or diarrhea     Notify your health care provider if you experience any of the following:  severe uncontrolled pain     Notify your health care provider if you experience any of the following:  difficulty breathing or increased cough     Notify your health care provider if you experience any of the following:  severe persistent headache     Notify your health care provider if you experience any of the following:  worsening rash     Notify your health care provider if you experience any of the following:  persistent dizziness, light-headedness, or visual disturbances     Notify your health care provider if you  experience any of the following:  increased confusion or weakness     Notify your health care provider if you experience any of the following:   Order Comments: --Vaginal bleeding more than 1 pad/hour for more than 2 hours  --Overwhelming feelings of anxiety or sadness greater than 50% of the time     Activity as tolerated       Follow-up Information     Temitope Li DO. Schedule an appointment as soon as possible for a visit in 6 weeks.    Specialty:  Obstetrics and Gynecology  Why:  Post Partum Appointment  Contact information:  [address]  [place] [address] [phone]             [facility] [address]. Schedule an appointment as soon as possible for a visit in 1 week.    Specialty:  Obstetrics and Gynecology  Why:  For blood pressure check  Contact information:  [address]  [place] [place] [facility] [address] [phone]                  Jose M Jeronimo MD  PGY2, OBN  [facility]

## 2019-01-06 NOTE — PROGRESS NOTES
POSTPARTUM PROGRESS NOTE     Isabela Frankel is a 18 y.o. female PPD #2 status post Spontaneous vaginal delivery at 39w6d in a pregnancy complicated by pre-e with SF, HSV-2. Patient is doing well this morning. She denies nausea, vomiting, fever or chills.  Patient reports mild abdominal pain that is well relieved by oral pain medications. Lochia is mild and stable. Patient is voiding without difficulty and ambulating with no difficulty.   Denies sx of pre-eclampsia - HA, SOB, RUQ pain, visual changes  Patient does plan to breast feed. Primary OB for contraception. She desires circumcision.    Objective:       Temp:  [98.1 °F (36.7 °C)-98.3 °F (36.8 °C)] 98.1 °F (36.7 °C)  Pulse:  [] 93  Resp:  [16-18] 18  SpO2:  [95 %-100 %] 95 %  BP: (107-125)/(52-82) 110/56    Physical Exam  A&Ox3, NAD  nonlabored breathing, no respiratory distress  Abd soft, nontender, nondistended  Fundus firm below umbilicus  No LE edema  Appropriate mood & affect    Lab Review  No results found for this or any previous visit (from the past 4 hour(s)).    I/O    Intake/Output Summary (Last 24 hours) at 1/6/2019 0630  Last data filed at 1/5/2019 1600  Gross per 24 hour   Intake --   Output 1900 ml   Net -1900 ml        Assessment:     Patient Active Problem List   Diagnosis    HSV-2 (herpes simplex virus 2) infection    Pregnancy with one fetus, antepartum    First pregnancy in adolescent 16 years of age or older in third trimester    Asthma- childhood    Anemia affecting pregnancy in third trimester    Encounter for induction of labor        Plan:   1. Postpartum care:  - Patient doing well. Continue routine management and advances.  - Continue PO pain meds. Pain well controlled.  - Heme: H/h 9/28  - Encourage ambulation  - Circumcision orders placed  - Contraception defer to primary OB  - Lactation  PRN lactation consult     2. Pre-E  - BP: (107-125)/(52-82) 110/56  - Asymptomatic, s/pmag    3. Anemia  - VSS, Asymptomatic  -  Fe/Ravinder Jeronimo MD  PGY2, OBGYN [facility]

## 2019-01-07 ENCOUNTER — TELEPHONE (OUTPATIENT)
Dept: OBSTETRICS AND GYNECOLOGY | Facility: CLINIC | Age: 19
End: 2019-01-07

## 2019-01-07 NOTE — TELEPHONE ENCOUNTER
----- Message from Nette Joshua sent at 1/7/2019 10:32 AM CST -----  Contact: Pt   Name of Who is Calling: ASIM GOLD [[id number]]      What is the request in detail: Patient is requesting a call from staff to schedule her post-op appointment. Please contact to further discuss and advise      Can the clinic reply by [facility]: Yes       What Number [facility] [phone]       Spoke with patient schedule patient 6 week check up. Patient verbalized and understand

## 2019-01-10 NOTE — PHYSICIAN QUERY
"PT Name: Isabela Frankel  MR #: [id number]    Physician Query Form - Hematology Clarification      CDS/: CRITSIAN Beltran,RNC-MNN           Contact information:[email or web address]    This form is a permanent document in the medical record.      Query Date: January 10, 2019    By submitting this query, we are merely seeking further clarification of documentation. Please utilize your independent clinical judgment when addressing the question(s) below.    The Medical record contains the following:   Indicators  Supporting Clinical Findings Location in Medical Record   X "Anemia" documented Anemia OB Progress note 1/6@202pm   X H & H = Hgb=9.0-10.2  Hct=28.0-32.9 LAB 1/3-1/5    BP =                     HR=      "GI bleeding" documented      Acute bleeding (Non GI site)      Transfusion(s)     X Treatment: Fe/Colace   OB Progress note 1/6@202pm    Other:        Provider, please specify diagnosis or diagnoses associated with above clinical findings.    [x] Iron deficiency anemia     [x] Other Hematological Diagnosis (please specify): Acute posthemorrhagic anemia     [  ] Clinically Undetermined       Please document in your progress notes daily for the duration of treatment, until resolved, and include in your discharge summary.                                                                                                      "

## 2019-01-15 NOTE — ADDENDUM NOTE
Addendum  created 01/15/19 0648 by Prabhu Kennedy MD    Intraprocedure Blocks edited, Sign clinical note

## 2019-01-16 ENCOUNTER — PATIENT MESSAGE (OUTPATIENT)
Dept: ADMINISTRATIVE | Facility: OTHER | Age: 19
End: 2019-01-16

## 2019-01-16 ENCOUNTER — PATIENT MESSAGE (OUTPATIENT)
Dept: OBSTETRICS AND GYNECOLOGY | Facility: CLINIC | Age: 19
End: 2019-01-16

## 2019-01-17 ENCOUNTER — PATIENT MESSAGE (OUTPATIENT)
Dept: OBSTETRICS AND GYNECOLOGY | Facility: CLINIC | Age: 19
End: 2019-01-17

## 2019-01-28 ENCOUNTER — PATIENT MESSAGE (OUTPATIENT)
Dept: ADMINISTRATIVE | Facility: OTHER | Age: 19
End: 2019-01-28

## 2019-02-20 ENCOUNTER — POSTPARTUM VISIT (OUTPATIENT)
Dept: OBSTETRICS AND GYNECOLOGY | Facility: CLINIC | Age: 19
End: 2019-02-20
Attending: OBSTETRICS & GYNECOLOGY
Payer: MEDICAID

## 2019-02-20 VITALS
SYSTOLIC BLOOD PRESSURE: 102 MMHG | DIASTOLIC BLOOD PRESSURE: 60 MMHG | BODY MASS INDEX: 20.32 KG/M2 | WEIGHT: 110.44 LBS | HEIGHT: 62 IN

## 2019-02-20 DIAGNOSIS — Z30.09 GENERAL COUNSELING AND ADVICE FOR CONTRACEPTIVE MANAGEMENT: ICD-10-CM

## 2019-02-20 PROBLEM — Z34.90 ENCOUNTER FOR INDUCTION OF LABOR: Status: RESOLVED | Noted: 2019-01-03 | Resolved: 2019-02-20

## 2019-02-20 PROBLEM — O99.013 ANEMIA AFFECTING PREGNANCY IN THIRD TRIMESTER: Status: RESOLVED | Noted: 2018-11-15 | Resolved: 2019-02-20

## 2019-02-20 PROBLEM — Z34.90 PREGNANCY WITH ONE FETUS, ANTEPARTUM: Status: RESOLVED | Noted: 2018-06-27 | Resolved: 2019-02-20

## 2019-02-20 PROBLEM — Z34.03 FIRST PREGNANCY IN ADOLESCENT 16 YEARS OF AGE OR OLDER IN THIRD TRIMESTER: Status: RESOLVED | Noted: 2018-10-17 | Resolved: 2019-02-20

## 2019-02-20 PROCEDURE — 99999 PR PBB SHADOW E&M-EST. PATIENT-LVL III: CPT | Mod: PBBFAC,,, | Performed by: OBSTETRICS & GYNECOLOGY

## 2019-02-20 PROCEDURE — 0503F POSTPARTUM CARE VISIT: CPT | Mod: ,,, | Performed by: OBSTETRICS & GYNECOLOGY

## 2019-02-20 PROCEDURE — 99999 PR PBB SHADOW E&M-EST. PATIENT-LVL III: ICD-10-PCS | Mod: PBBFAC,,, | Performed by: OBSTETRICS & GYNECOLOGY

## 2019-02-20 PROCEDURE — 99213 OFFICE O/P EST LOW 20 MIN: CPT | Mod: PBBFAC | Performed by: OBSTETRICS & GYNECOLOGY

## 2019-02-20 PROCEDURE — 0503F PR POSTPARTUM CARE VISIT: ICD-10-PCS | Mod: ,,, | Performed by: OBSTETRICS & GYNECOLOGY

## 2019-02-20 RX ORDER — NORELGESTROMIN AND ETHINYL ESTRADIOL 35; 150 UG/MG; UG/MG
1 PATCH TRANSDERMAL
Qty: 4 PATCH | Refills: 11 | Status: SHIPPED | OUTPATIENT
Start: 2019-02-20 | End: 2019-03-18

## 2019-02-20 NOTE — PROGRESS NOTES
"CC: Post-partum follow-up    HPI:  Isabela Frankel is a 18 y.o. female  presents for post-partum visit s/p a .    Delivery Date: 2019  Delivery MD: Guillermo  Gender: male  Birth Weight: 5 pounds 3 ounces  Breast Feeding: NO  Depression: NO, some sadness about her partner and his lack of support, interested in exploring options for therapists, names given.  Feels well bonded to baby and very happy when she is with her son.  Contraception: Ortho-Evra    Pregnancy was complicated by:  HSV, new diagnosis this IUP  ROS:  GENERAL: No fever, chills, fatigability or weight loss.  VULVAR: No pain, no lesions and no itching.  VAGINAL: No relaxation, no itching, no discharge, no abnormal bleeding and no lesions.  ABDOMEN: No abdominal pain. Denies nausea. Denies vomiting. No diarrhea. No constipation  BREAST: Denies pain. No lumps. No discharge.  URINARY: No incontinence, no nocturia, no frequency and no dysuria.  CARDIOVASCULAR: No chest pain. No shortness of breath. No leg cramps.  NEUROLOGICAL: No headaches. No vision changes.    PHYSICAL EXAM:  /60   Ht 5' 2" (1.575 m)   Wt 50.1 kg (110 lb 7.2 oz)   LMP 2018   Breastfeeding? No   BMI 20.20 kg/m²    GEN: AAO x3, NAD  PELVIC: EGBUS within normal limits, normal vagina and vulva, multiparous os, uterus normal size, shape, consistency, no mass or tenderness  PSYCH: very good insight, normal mood and affect.          Diagnosis:  1. Routine postpartum follow-up    2. General counseling and advice for contraceptive management        Plan:   May resume normal activity  Discussed therapists and support groups, resources given  F/U in 2-3 months for further discussion and WWE  Orders Placed This Encounter    norelgestromin-ethinyl estradiol (ORTHO EVRA) 150-35 mcg/24 hr         Patient was counseled today on A.C.S. Pap guidelines and recommendations for yearly pelvic exams and monthly self breast exams; to see her PCP for other health " maintenance.

## 2019-03-18 ENCOUNTER — PATIENT MESSAGE (OUTPATIENT)
Dept: ADMINISTRATIVE | Facility: OTHER | Age: 19
End: 2019-03-18

## 2019-03-18 ENCOUNTER — PATIENT MESSAGE (OUTPATIENT)
Dept: OBSTETRICS AND GYNECOLOGY | Facility: CLINIC | Age: 19
End: 2019-03-18

## 2019-03-18 RX ORDER — MEDROXYPROGESTERONE ACETATE 150 MG/ML
150 INJECTION, SUSPENSION INTRAMUSCULAR ONCE
Qty: 1 ML | Refills: 3 | Status: SHIPPED | OUTPATIENT
Start: 2019-03-18 | End: 2020-07-27

## 2019-06-25 ENCOUNTER — HOSPITAL ENCOUNTER (EMERGENCY)
Facility: HOSPITAL | Age: 19
Discharge: HOME | End: 2019-06-25
Attending: PEDIATRICS
Payer: OTHER MISCELLANEOUS

## 2019-06-25 VITALS
HEART RATE: 100 BPM | BODY MASS INDEX: 20.24 KG/M2 | OXYGEN SATURATION: 99 % | DIASTOLIC BLOOD PRESSURE: 80 MMHG | WEIGHT: 110 LBS | SYSTOLIC BLOOD PRESSURE: 116 MMHG | RESPIRATION RATE: 18 BRPM | TEMPERATURE: 98 F | HEIGHT: 62 IN

## 2019-06-25 DIAGNOSIS — R55 NEAR SYNCOPE: Primary | ICD-10-CM

## 2019-06-25 LAB
B-HCG UR QL: NEGATIVE
BASOPHILS # BLD AUTO: 0.03 K/UL (ref 0–0.2)
BASOPHILS NFR BLD: 0.7 % (ref 0–1.9)
CTP QC/QA: YES
DIFFERENTIAL METHOD: NORMAL
EOSINOPHIL # BLD AUTO: 0.2 K/UL (ref 0–0.5)
EOSINOPHIL NFR BLD: 4.1 % (ref 0–8)
ERYTHROCYTE [DISTWIDTH] IN BLOOD BY AUTOMATED COUNT: 13.3 % (ref 11.5–14.5)
HCT VFR BLD AUTO: 39.3 % (ref 37–48.5)
HGB BLD-MCNC: 13 G/DL (ref 12–16)
IMM GRANULOCYTES # BLD AUTO: 0.01 K/UL (ref 0–0.04)
IMM GRANULOCYTES NFR BLD AUTO: 0.2 % (ref 0–0.5)
LYMPHOCYTES # BLD AUTO: 1.4 K/UL (ref 1–4.8)
LYMPHOCYTES NFR BLD: 30.9 % (ref 18–48)
MCH RBC QN AUTO: 29 PG (ref 27–31)
MCHC RBC AUTO-ENTMCNC: 33.1 G/DL (ref 32–36)
MCV RBC AUTO: 88 FL (ref 82–98)
MONOCYTES # BLD AUTO: 0.3 K/UL (ref 0.3–1)
MONOCYTES NFR BLD: 6.3 % (ref 4–15)
NEUTROPHILS # BLD AUTO: 2.7 K/UL (ref 1.8–7.7)
NEUTROPHILS NFR BLD: 57.8 % (ref 38–73)
NRBC BLD-RTO: 0 /100 WBC
PLATELET # BLD AUTO: 159 K/UL (ref 150–350)
PMV BLD AUTO: 10.2 FL (ref 9.2–12.9)
RBC # BLD AUTO: 4.49 M/UL (ref 4–5.4)
RETICS/RBC NFR AUTO: 0.9 % (ref 0.5–2.5)
WBC # BLD AUTO: 4.59 K/UL (ref 3.9–12.7)

## 2019-06-25 PROCEDURE — 25000003 PHARM REV CODE 250: Performed by: PEDIATRICS

## 2019-06-25 PROCEDURE — 93005 ELECTROCARDIOGRAM TRACING: CPT

## 2019-06-25 PROCEDURE — 85025 COMPLETE CBC W/AUTO DIFF WBC: CPT

## 2019-06-25 PROCEDURE — 81025 URINE PREGNANCY TEST: CPT | Performed by: PEDIATRICS

## 2019-06-25 PROCEDURE — 99284 EMERGENCY DEPT VISIT MOD MDM: CPT | Mod: 25

## 2019-06-25 PROCEDURE — 93010 ELECTROCARDIOGRAM REPORT: CPT | Mod: ,,, | Performed by: INTERNAL MEDICINE

## 2019-06-25 PROCEDURE — 93010 EKG 12-LEAD: ICD-10-PCS | Mod: ,,, | Performed by: INTERNAL MEDICINE

## 2019-06-25 PROCEDURE — 85045 AUTOMATED RETICULOCYTE COUNT: CPT

## 2019-06-25 PROCEDURE — 99284 EMERGENCY DEPT VISIT MOD MDM: CPT | Mod: ,,, | Performed by: PEDIATRICS

## 2019-06-25 PROCEDURE — 99284 PR EMERGENCY DEPT VISIT,LEVEL IV: ICD-10-PCS | Mod: ,,, | Performed by: PEDIATRICS

## 2019-06-25 RX ADMIN — SODIUM CHLORIDE 1000 ML: 0.9 INJECTION, SOLUTION INTRAVENOUS at 04:06

## 2019-06-25 NOTE — ED PROVIDER NOTES
Encounter Date: 6/25/2019       History     Chief Complaint   Patient presents with    Loss of Consciousness     Pt was working at [employer] outside and she stood up felt dizzy. Pt went into the office and staff of [employer] reports pt layed down but did not pass out.      19 yo female was outside in the sun.  She works as a camp counselor.  She was kneeling next to a child and when she tessie, she felt faint and had to sit down on a nearby stage.  Then it was time to go inside and when she got up again, she felt faint again.  She came inside for help.  She was having trouble breathing and rapid heart rate.  Patient reporting not being able to feel legs although she remained standing and was able to move them.  Patient became more faint and was helped to the floor.  Bystanders unsure if she actually LOC, but they think not. Person at the camp treated her with rescue breaths. And Patient was treated with cold towels.  EMS called.  Patient thinks it was just overheating and anxiety.  Patient reports similar event last year when pregnant under similar circumstances.   BS by EMS around 112   No fever, No cough/URI, No N/V/D, No ST.  No menses 2/2 Depo.  Last UOP in ER and at 1300.  Drank about 32 oz today.   Had onlay a sandwich for breakfast, nothing since except powerade and water.  No palpitations.  LOC improved in ambulance and felt back to normal upon arrival to ER.      ILLNESS: asthma (no sx since 7yo), ALLERGIES: none, SURGERIES: none, HOSPITALIZATIONS: birth of child, MEDICATIONS: OBCP, Immunizations: UTD.      The history is provided by the patient, a relative and a parent.     Review of patient's allergies indicates:   Allergen Reactions    Sulfa (sulfonamide antibiotics)      Past Medical History:   Diagnosis Date    Asthma      Past Surgical History:   Procedure Laterality Date    DILATION AND CURETTAGE OF UTERUS  09/11/2017     Family History   Problem Relation Age of Onset    Breast cancer Neg Hx      Cancer Neg Hx     Ovarian cancer Neg Hx     Colon cancer Neg Hx      Social History     Tobacco Use    Smoking status: Never Smoker    Smokeless tobacco: Never Used   Substance Use Topics    Alcohol use: No    Drug use: No     Review of Systems   Constitutional: Negative for fever.   HENT: Negative for congestion and rhinorrhea.    Eyes: Negative for visual disturbance.   Respiratory: Positive for shortness of breath. Negative for cough.    Gastrointestinal: Negative for diarrhea and vomiting.   Genitourinary: Negative for decreased urine volume.   Musculoskeletal: Negative for gait problem.   Skin: Negative for rash.   Allergic/Immunologic: Negative for immunocompromised state.   Neurological: Positive for weakness and light-headedness. Negative for seizures.   Hematological: Does not bruise/bleed easily.       Physical Exam     Initial Vitals [06/25/19 1502]   BP Pulse Resp Temp SpO2   126/80 100 18 97.7 °F (36.5 °C) 98 %      MAP       --         Physical Exam    Nursing note and vitals reviewed.  Constitutional: She appears well-developed and well-nourished. No distress.   HENT:   Right Ear: External ear normal.   Left Ear: External ear normal.   Mouth/Throat: Oropharynx is clear and moist. No oropharyngeal exudate.   Eyes: Conjunctivae are normal.   Neck: Neck supple.   Cardiovascular: Normal rate, regular rhythm and normal heart sounds. Exam reveals no gallop and no friction rub.    No murmur heard.  Pulmonary/Chest: Breath sounds normal. No respiratory distress.   Abdominal: Soft. She exhibits no distension and no mass. There is no tenderness.   Musculoskeletal: Normal range of motion. She exhibits no edema.   Lymphadenopathy:     She has no cervical adenopathy.   Neurological: She is alert and oriented to person, place, and time. She has normal strength.   Skin: Skin is warm and dry. No rash noted.         ED Course   Procedures  Labs Reviewed   RETICULOCYTES   CBC W/ AUTO DIFFERENTIAL   POCT URINE  PREGNANCY     EKG Readings: (Independently Interpreted)   Rhythm: Normal Sinus Rhythm. Ectopy: No Ectopy. Conduction: Normal. ST Segments: Normal ST Segments. T Waves: Normal. Clinical Impression: Normal Sinus Rhythm     ECG Results          EKG 12-lead (Final result)  Result time 06/26/19 07:17:32    Final result by Interface, Lab In [facility] (06/26/19 07:17:32)                 Narrative:    Test Reason : R55,    Vent. Rate : 103 BPM     Atrial Rate : 103 BPM     P-R Int : 128 ms          QRS Dur : 074 ms      QT Int : 310 ms       P-R-T Axes : 079 084 061 degrees     QTc Int : 406 ms    **Age and gender specific analysis**  Sinus tachycardia  Otherwise normal ECG  No previous ECGs available  Confirmed by Abelardo CAI MD, Eran HENDERSON ([id number]) on 6/26/2019 7:17:26 AM    Referred By: PABLO   SELF           Confirmed By:Eran Zhou III, MD                            Imaging Results    None          Medical Decision Making:   History:   I obtained history from: someone other than patient.  Old Medical Records: I decided to obtain old medical records.  Initial Assessment:   17 yo female with near syncopal episode, now back to baseline  Differential Diagnosis:   Vasovagal syncope  Anemia  Cardiac arrhythmia  Dehydration  Seizure    Clinical Tests:   Lab Tests: Ordered and Reviewed  The following lab test(s) were unremarkable: CBC  Medical Tests: Ordered and Reviewed  ED Management:  Orthostatic BP normal.  On review of old labs, was mildly anemic,  However, repeat today is normal.                        Clinical Impression:       ICD-10-CM ICD-9-CM   1. Near syncope R55 780.2         Disposition:   Disposition: Discharged  Condition: Stable  Near syncopal episode.  Now back to baseline.  EKG, orthostatic BP, and H/H normal.  Exam unremarkable.                          Malcolm Mckeon MD  06/26/19 7522

## 2019-06-25 NOTE — ED TRIAGE NOTES
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  Pt was outside when she suddenly felt dizzy and appeared "dazed" per aunt.  Pt did not pass out, but she did go inside to lie down.  EMS states she was tachycardic and tachypnic at the scene.  She is now stable.  HR/RR normal.        LOC awake and alert, cooperative, calm affect, recognizes caregiver, responds appropriately for age  APPEARANCE resting comfortably in no acute distress. Pt has clean skin, nails, and clothes.   HEENT Head appears normal in size and shape,  Eyes appear normal w/o drainage, Ears appear normal w/o drainage, nose appears normal w/o drainage/mucus, Throat and neck appear normal w/o drainage/redness  NEURO eyes open spontaneously, responses appropriate, pupils equal in size,  RESPIRATORY airway open and patent, respirations of regular rate and rhythm, nonlabored, no respiratory distress observed  MUSCULOSKELETAL moves all extremities well, no obvious deformities  SKIN normal color for ethnicity, warm, dry, with normal turgor, moist mucous membranes, no bruising or breakdown observed  ABDOMEN soft, non tender, non distended, no guarding, regular bowel movements  GENITOURINARY voiding well, no difficulty starting a stream, denies pain, burning, itching    "

## 2020-04-21 DIAGNOSIS — Z01.84 ANTIBODY RESPONSE EXAMINATION: ICD-10-CM

## 2020-05-21 DIAGNOSIS — Z01.84 ANTIBODY RESPONSE EXAMINATION: ICD-10-CM

## 2020-06-20 DIAGNOSIS — Z01.84 ANTIBODY RESPONSE EXAMINATION: ICD-10-CM

## 2020-07-20 DIAGNOSIS — Z01.84 ANTIBODY RESPONSE EXAMINATION: ICD-10-CM

## 2020-08-19 DIAGNOSIS — Z01.84 ANTIBODY RESPONSE EXAMINATION: ICD-10-CM

## 2020-09-18 DIAGNOSIS — Z01.84 ANTIBODY RESPONSE EXAMINATION: ICD-10-CM

## 2020-10-18 DIAGNOSIS — Z01.84 ANTIBODY RESPONSE EXAMINATION: ICD-10-CM

## 2020-11-17 DIAGNOSIS — Z01.84 ANTIBODY RESPONSE EXAMINATION: ICD-10-CM

## 2021-04-21 RX ORDER — MEDROXYPROGESTERONE ACETATE 150 MG/ML
INJECTION, SUSPENSION INTRAMUSCULAR
Qty: 1 ML | Refills: 0 | OUTPATIENT
Start: 2021-04-21

## 2021-04-22 ENCOUNTER — PATIENT MESSAGE (OUTPATIENT)
Dept: OBSTETRICS AND GYNECOLOGY | Facility: CLINIC | Age: 21
End: 2021-04-22

## 2021-04-25 ENCOUNTER — PATIENT MESSAGE (OUTPATIENT)
Dept: OBSTETRICS AND GYNECOLOGY | Facility: CLINIC | Age: 21
End: 2021-04-25

## 2021-04-27 RX ORDER — MEDROXYPROGESTERONE ACETATE 150 MG/ML
150 INJECTION, SUSPENSION INTRAMUSCULAR ONCE
Qty: 1 ML | Refills: 0 | Status: SHIPPED | OUTPATIENT
Start: 2021-04-27 | End: 2021-06-08

## 2021-06-08 ENCOUNTER — OFFICE VISIT (OUTPATIENT)
Dept: OBSTETRICS AND GYNECOLOGY | Facility: CLINIC | Age: 21
End: 2021-06-08
Attending: OBSTETRICS & GYNECOLOGY
Payer: MEDICAID

## 2021-06-08 VITALS
WEIGHT: 112.19 LBS | SYSTOLIC BLOOD PRESSURE: 112 MMHG | DIASTOLIC BLOOD PRESSURE: 68 MMHG | BODY MASS INDEX: 20.52 KG/M2

## 2021-06-08 DIAGNOSIS — N89.8 VAGINAL DISCHARGE: ICD-10-CM

## 2021-06-08 DIAGNOSIS — Z01.419 WELL WOMAN EXAM WITH ROUTINE GYNECOLOGICAL EXAM: Primary | ICD-10-CM

## 2021-06-08 DIAGNOSIS — Z30.09 GENERAL COUNSELING AND ADVICE FOR CONTRACEPTIVE MANAGEMENT: ICD-10-CM

## 2021-06-08 PROCEDURE — 99999 PR PBB SHADOW E&M-EST. PATIENT-LVL II: CPT | Mod: PBBFAC,,, | Performed by: OBSTETRICS & GYNECOLOGY

## 2021-06-08 PROCEDURE — 88175 CYTOPATH C/V AUTO FLUID REDO: CPT | Performed by: OBSTETRICS & GYNECOLOGY

## 2021-06-08 PROCEDURE — 99212 OFFICE O/P EST SF 10 MIN: CPT | Mod: PBBFAC | Performed by: OBSTETRICS & GYNECOLOGY

## 2021-06-08 PROCEDURE — 99395 PR PREVENTIVE VISIT,EST,18-39: ICD-10-PCS | Mod: S$PBB,,, | Performed by: OBSTETRICS & GYNECOLOGY

## 2021-06-08 PROCEDURE — 87591 N.GONORRHOEAE DNA AMP PROB: CPT | Mod: 59 | Performed by: OBSTETRICS & GYNECOLOGY

## 2021-06-08 PROCEDURE — 99395 PREV VISIT EST AGE 18-39: CPT | Mod: S$PBB,,, | Performed by: OBSTETRICS & GYNECOLOGY

## 2021-06-08 PROCEDURE — 99999 PR PBB SHADOW E&M-EST. PATIENT-LVL II: ICD-10-PCS | Mod: PBBFAC,,, | Performed by: OBSTETRICS & GYNECOLOGY

## 2021-06-08 PROCEDURE — 87481 CANDIDA DNA AMP PROBE: CPT | Mod: 59 | Performed by: OBSTETRICS & GYNECOLOGY

## 2021-06-08 PROCEDURE — 87491 CHLMYD TRACH DNA AMP PROBE: CPT | Performed by: OBSTETRICS & GYNECOLOGY

## 2021-06-08 RX ORDER — NORETHINDRONE ACETATE AND ETHINYL ESTRADIOL, ETHINYL ESTRADIOL AND FERROUS FUMARATE 1MG-10(24)
1 KIT ORAL DAILY
Qty: 90 TABLET | Refills: 3 | Status: SHIPPED | OUTPATIENT
Start: 2021-06-08 | End: 2022-08-08

## 2021-06-10 LAB
BACTERIAL VAGINOSIS DNA: POSITIVE
CANDIDA GLABRATA DNA: NEGATIVE
CANDIDA KRUSEI DNA: NEGATIVE
CANDIDA RRNA VAG QL PROBE: NEGATIVE
T VAGINALIS RRNA GENITAL QL PROBE: POSITIVE

## 2021-06-11 LAB
C TRACH DNA SPEC QL NAA+PROBE: NOT DETECTED
N GONORRHOEA DNA SPEC QL NAA+PROBE: NOT DETECTED

## 2021-06-14 ENCOUNTER — PATIENT MESSAGE (OUTPATIENT)
Dept: OBSTETRICS AND GYNECOLOGY | Facility: CLINIC | Age: 21
End: 2021-06-14

## 2021-06-14 LAB
FINAL PATHOLOGIC DIAGNOSIS: NORMAL
Lab: NORMAL

## 2021-06-15 RX ORDER — METRONIDAZOLE 500 MG/1
500 TABLET ORAL EVERY 12 HOURS
Qty: 14 TABLET | Refills: 0 | Status: SHIPPED | OUTPATIENT
Start: 2021-06-15 | End: 2021-06-22

## 2023-05-19 ENCOUNTER — TELEPHONE (OUTPATIENT)
Dept: OBSTETRICS AND GYNECOLOGY | Facility: CLINIC | Age: 23
End: 2023-05-19
Payer: MEDICAID

## 2023-12-27 ENCOUNTER — HOSPITAL ENCOUNTER (EMERGENCY)
Facility: HOSPITAL | Age: 23
Discharge: HOME | End: 2023-12-27
Attending: EMERGENCY MEDICINE
Payer: MEDICAID

## 2023-12-27 VITALS
HEIGHT: 62 IN | HEART RATE: 94 BPM | RESPIRATION RATE: 16 BRPM | SYSTOLIC BLOOD PRESSURE: 105 MMHG | OXYGEN SATURATION: 99 % | BODY MASS INDEX: 20.24 KG/M2 | TEMPERATURE: 99 F | DIASTOLIC BLOOD PRESSURE: 56 MMHG | WEIGHT: 110 LBS

## 2023-12-27 DIAGNOSIS — R09.81 NASAL CONGESTION: ICD-10-CM

## 2023-12-27 DIAGNOSIS — R52 GENERALIZED BODY ACHES: ICD-10-CM

## 2023-12-27 DIAGNOSIS — J02.0 STREP PHARYNGITIS: Primary | ICD-10-CM

## 2023-12-27 DIAGNOSIS — J02.9 SORE THROAT: ICD-10-CM

## 2023-12-27 LAB
CTP QC/QA: YES
INFLUENZA A ANTIGEN, POC: NEGATIVE
INFLUENZA B ANTIGEN, POC: NEGATIVE
POC RAPID STREP A: POSITIVE
SARS-COV-2 RDRP RESP QL NAA+PROBE: NEGATIVE

## 2023-12-27 PROCEDURE — 99284 EMERGENCY DEPT VISIT MOD MDM: CPT | Mod: ER

## 2023-12-27 PROCEDURE — 87635 SARS-COV-2 COVID-19 AMP PRB: CPT | Mod: ER

## 2023-12-27 PROCEDURE — 87804 INFLUENZA ASSAY W/OPTIC: CPT | Mod: ER

## 2023-12-27 RX ORDER — AMOXICILLIN 500 MG/1
500 TABLET, FILM COATED ORAL EVERY 12 HOURS
Qty: 20 TABLET | Refills: 0 | Status: SHIPPED | OUTPATIENT
Start: 2023-12-27 | End: 2024-01-06

## 2023-12-27 RX ORDER — CETIRIZINE HYDROCHLORIDE 10 MG/1
10 TABLET ORAL DAILY PRN
Qty: 30 TABLET | Refills: 0 | Status: SHIPPED | OUTPATIENT
Start: 2023-12-27

## 2023-12-27 RX ORDER — PROMETHAZINE HYDROCHLORIDE AND DEXTROMETHORPHAN HYDROBROMIDE 6.25; 15 MG/5ML; MG/5ML
5 SYRUP ORAL EVERY 4 HOURS PRN
Qty: 118 ML | Refills: 0 | Status: SHIPPED | OUTPATIENT
Start: 2023-12-27

## 2023-12-27 RX ORDER — FLUTICASONE PROPIONATE 50 MCG
1 SPRAY, SUSPENSION (ML) NASAL 2 TIMES DAILY PRN
Qty: 15 G | Refills: 0 | Status: SHIPPED | OUTPATIENT
Start: 2023-12-27

## 2023-12-27 RX ORDER — NAPROXEN 500 MG/1
500 TABLET ORAL 2 TIMES DAILY PRN
Qty: 30 TABLET | Refills: 0 | Status: SHIPPED | OUTPATIENT
Start: 2023-12-27

## 2023-12-27 NOTE — Clinical Note
"Isabela"Bri Frankel was seen and treated in our emergency department on 12/27/2023.  She may return to work on 12/30/2023.       If you have any questions or concerns, please don't hesitate to call.      [place], Alayna, PA-C"

## 2023-12-27 NOTE — ED PROVIDER NOTES
Encounter Date: 12/27/2023    SCRIBE #1 NOTE: I, Nisha Collins, am scribing for, and in the presence of,  Alayna Holdsworth, PA-C.       History     Chief Complaint   Patient presents with    Sore Throat    COVID-19 Concerns    URI     A 22 y/o female presents to the ER c/o sore throat, URI symptoms, and flu symptoms since yesterday. Pt reports taking Theraflu.     Isabela Frankel is a 23 y.o. female, with a PMHx of asthma, who presents to the ED with a sore throat since yesterday. Patient reports chills, headache, congestion, and body aches. Reports trouble swallowing secondary to pain. Attempted Tx w/ theraflu w/ mild relief. Reports sick contact w/ son w/ similar symptoms. No other exacerbating or alleviating factors. Denies fever, rhinorrhea, cough, nausea, vomiting, diarrhea, dysuria, urinary frequency, decreased urine or other associated symptoms.       The history is provided by the patient. No  was used.     Review of patient's allergies indicates:   Allergen Reactions    Sulfa (sulfonamide antibiotics)      Past Medical History:   Diagnosis Date    Asthma      Past Surgical History:   Procedure Laterality Date    DILATION AND CURETTAGE OF UTERUS  09/11/2017     Family History   Problem Relation Age of Onset    Breast cancer Neg Hx     Cancer Neg Hx     Ovarian cancer Neg Hx     Colon cancer Neg Hx      Social History     Tobacco Use    Smoking status: Never    Smokeless tobacco: Never   Substance Use Topics    Alcohol use: No    Drug use: No     Review of Systems   Constitutional:  Positive for chills. Negative for diaphoresis and fever.   HENT:  Positive for congestion, sore throat and trouble swallowing (secondary to pain). Negative for rhinorrhea.    Respiratory:  Negative for cough and shortness of breath.    Cardiovascular:  Negative for chest pain.   Gastrointestinal:  Negative for abdominal pain, constipation, diarrhea, nausea and vomiting.   Genitourinary:  Negative for  decreased urine volume, dysuria and frequency.   Musculoskeletal:  Positive for myalgias (generalized).   Skin:  Negative for rash.   Neurological:  Positive for headaches. Negative for dizziness, weakness and light-headedness.       Physical Exam     Initial Vitals [12/27/23 1409]   BP Pulse Resp Temp SpO2   94/69 105 18 98.3 °F (36.8 °C) 98 %      MAP       --         Physical Exam    Nursing note and vitals reviewed.  Constitutional: Vital signs are normal. She appears well-developed and well-nourished. She is not diaphoretic. She is active. She does not appear ill. No distress.   HENT:   Head: Normocephalic and atraumatic.   Right Ear: External ear normal.   Left Ear: External ear normal.   Nose: Nose normal.   Mouth/Throat: Uvula is midline. Posterior oropharyngeal erythema present. No oropharyngeal exudate, posterior oropharyngeal edema or tonsillar abscesses.   Eyes: Conjunctivae, EOM and lids are normal. Pupils are equal, round, and reactive to light. Right eye exhibits no discharge. Left eye exhibits no discharge.   Neck: Phonation normal. Neck supple. No stridor present.   Normal range of motion.   Full passive range of motion without pain.     Cardiovascular:  Normal rate and regular rhythm.           Pulmonary/Chest: Effort normal and breath sounds normal. No respiratory distress.   Abdominal: She exhibits no distension.   Musculoskeletal:         General: Normal range of motion.      Cervical back: Full passive range of motion without pain, normal range of motion and neck supple.     Neurological: She is alert and oriented to person, place, and time. She has normal strength. GCS eye subscore is 4. GCS verbal subscore is 5. GCS motor subscore is 6.   Skin: Skin is dry. Capillary refill takes less than 2 seconds.         ED Course   Procedures  Labs Reviewed   POCT STREP A, RAPID - Abnormal; Notable for the following components:       Result Value    POC Rapid Strep A positive (*)     All other components  within normal limits   SARS-COV-2 RDRP GENE    Narrative:     This test utilizes isothermal nucleic acid amplification technology to detect the SARS-CoV-2 RdRp nucleic acid segment. The analytical sensitivity (limit of detection) is 500 copies/swab.     A POSITIVE result is indicative of the presence of SARS-CoV-2 RNA; clinical correlation with patient history and other diagnostic information is necessary to determine patient infection status.    A NEGATIVE result means that SARS-CoV-2 nucleic acids are not present above the limit of detection. A NEGATIVE result should be treated as presumptive. It does not rule out the possibility of COVID-19 and should not be the sole basis for treatment decisions. If COVID-19 is strongly suspected based on clinical and exposure history, re-testing using an alternate molecular assay should be considered.     This test is only for use under the Food and Drug Administration s Emergency Use Authorization (EUA).     Commercial kits are provided by [employer]. Performance characteristics of the EUA have been independently verified by [facility] Department of Pathology and Laboratory Medicine.   _________________________________________________________________   The authorized Fact Sheet for Healthcare Providers and the authorized Fact Sheet for Patients of the ID NOW COVID-19 are available on the FDA website:    https://www.fda.gov/media/545649/download      https://www.fda.gov/media/985707/download      POCT URINE PREGNANCY   POCT INFLUENZA A/B MOLECULAR   POCT STREP A MOLECULAR   POCT RAPID INFLUENZA A/B          Imaging Results    None          Medications - No data to display  Medical Decision Making  23 y.o. female, with a PMHx of asthma, who presents to the ED with a sore throat since yesterday.   Patient's chart and medical history reviewed.    Ddx:  COVID  Flu  Strep throat  Viral URI    Patient's vitals reviewed.  Afebrile, no respiratory distress, and  nontoxic-appearing in the ED. patient had erythematous throat on exam with no exudates or edema otherwise unremarkable.  Patient denied pain medication.  Patient is COVID and flu negative.  Patient is strep positive. Consider but unlikely Александр's angina and retropharyngeal abscess after history and physical exam; no stridor, drooling, trismus, neck tenderness on palpation, and neck pain with ROM. Patient will be sent home with amoxicillin.  Discussed with patient her is contagious until 48 hours of antibiotics are in her system, she verbalized understanding.  Instructed patient to rest and stay well hydrated, she verbalized understanding.  Patient will also be sent home on naproxen, Flonase, Zyrtec, benzocaine throat lozenges, and promethazine DM cough syrup for symptomatic control. Patient agrees with this plan. Discussed with her strict return precautions, she verbalized understanding. Patient is stable for discharge.       Amount and/or Complexity of Data Reviewed  Labs: ordered.    Risk  OTC drugs.  Prescription drug management.            Scribe Attestation:   Scribe #1: I performed the above scribed service and the documentation accurately describes the services I performed. I attest to the accuracy of the note.                           I, Alayna Holdsworth,PA-C, personally performed the services described in this documentation.  All medical record entries made by the scribe were at my direction and in my presence.  I have reviewed the chart and agree that the record reflects my personal performance and is accurate and complete.      Clinical Impression:  Final diagnoses:  [J02.0] Strep pharyngitis (Primary)  [J02.9] Sore throat  [R09.81] Nasal congestion  [R52] Generalized body aches          ED Disposition Condition    Discharge Stable          ED Prescriptions       Medication Sig Dispense Start Date End Date Auth. Provider    amoxicillin (AMOXIL) 500 MG Tab Take 1 tablet (500 mg total) by mouth every 12  (twelve) hours. for 10 days 20 tablet 12/27/2023 1/6/2024 Holdsworth, Alayna, PA-C    benzocaine-menthoL 6-10 mg lozenge Take 1 lozenge by mouth every 2 (two) hours as needed for Pain (sore throat). 18 tablet 12/27/2023 -- Holdsworth, Alayna, PA-C    cetirizine (ZYRTEC) 10 MG tablet Take 1 tablet (10 mg total) by mouth daily as needed for Allergies or Rhinitis. 30 tablet 12/27/2023 -- Holdsworth, Alayna, PA-C    fluticasone propionate (FLONASE) 50 mcg/actuation nasal spray 1 spray (50 mcg total) by Each Nostril route 2 (two) times daily as needed for Rhinitis or Allergies. 15 g 12/27/2023 -- Holdsworth, Alayna, PA-C    promethazine-dextromethorphan (PROMETHAZINE-DM) 6.25-15 mg/5 mL Syrp Take 5 mLs by mouth every 4 (four) hours as needed (cough/congestion). 118 mL 12/27/2023 -- Holdsworth, Alayna, PA-C    naproxen (NAPROSYN) 500 MG tablet Take 1 tablet (500 mg total) by mouth 2 (two) times daily as needed (Pain). 30 tablet 12/27/2023 -- Holdsworth, Alayna, PA-C          Follow-up Information       Follow up With Specialties Details Why Contact [facility] ED Emergency Medicine  If symptoms worsen [address] [place] [facility] [address] [phone]             Holdsworth, Alayna, PA-C  12/27/23 2127

## 2023-12-27 NOTE — DISCHARGE INSTRUCTIONS

## 2024-03-26 ENCOUNTER — OFFICE VISIT (OUTPATIENT)
Dept: OBSTETRICS AND GYNECOLOGY | Facility: CLINIC | Age: 24
End: 2024-03-26
Attending: OBSTETRICS & GYNECOLOGY
Payer: MEDICAID

## 2024-03-26 VITALS
BODY MASS INDEX: 19.92 KG/M2 | WEIGHT: 108.25 LBS | HEIGHT: 62 IN | SYSTOLIC BLOOD PRESSURE: 118 MMHG | DIASTOLIC BLOOD PRESSURE: 56 MMHG

## 2024-03-26 DIAGNOSIS — N92.0 MENORRHAGIA WITH REGULAR CYCLE: ICD-10-CM

## 2024-03-26 DIAGNOSIS — Z01.419 WELL WOMAN EXAM WITH ROUTINE GYNECOLOGICAL EXAM: Primary | ICD-10-CM

## 2024-03-26 DIAGNOSIS — Z11.3 SCREEN FOR STD (SEXUALLY TRANSMITTED DISEASE): ICD-10-CM

## 2024-03-26 DIAGNOSIS — N89.8 VAGINAL DISCHARGE: ICD-10-CM

## 2024-03-26 PROCEDURE — 99395 PREV VISIT EST AGE 18-39: CPT | Mod: S$PBB,,, | Performed by: OBSTETRICS & GYNECOLOGY

## 2024-03-26 PROCEDURE — 99999 PR PBB SHADOW E&M-EST. PATIENT-LVL II: CPT | Mod: PBBFAC,,, | Performed by: OBSTETRICS & GYNECOLOGY

## 2024-03-26 PROCEDURE — 87591 N.GONORRHOEAE DNA AMP PROB: CPT | Performed by: OBSTETRICS & GYNECOLOGY

## 2024-03-26 PROCEDURE — 99212 OFFICE O/P EST SF 10 MIN: CPT | Mod: PBBFAC | Performed by: OBSTETRICS & GYNECOLOGY

## 2024-03-26 PROCEDURE — 81514 NFCT DS BV&VAGINITIS DNA ALG: CPT | Performed by: OBSTETRICS & GYNECOLOGY

## 2024-03-26 PROCEDURE — 3078F DIAST BP <80 MM HG: CPT | Mod: CPTII,,, | Performed by: OBSTETRICS & GYNECOLOGY

## 2024-03-26 PROCEDURE — 3074F SYST BP LT 130 MM HG: CPT | Mod: CPTII,,, | Performed by: OBSTETRICS & GYNECOLOGY

## 2024-03-26 PROCEDURE — 3008F BODY MASS INDEX DOCD: CPT | Mod: CPTII,,, | Performed by: OBSTETRICS & GYNECOLOGY

## 2024-03-26 PROCEDURE — 88175 CYTOPATH C/V AUTO FLUID REDO: CPT | Performed by: OBSTETRICS & GYNECOLOGY

## 2024-03-26 NOTE — PROGRESS NOTES
"  CC: Well woman exam    Isabela Frankel is a 23 y.o. female  presents for well woman exam.  LMP: No LMP recorded..  Periods are heavy first 2 days.  Trying to conceive x 1 year.  Using flow louise, used OPK x 1 month, has had positives.  Partner has not had S.A. same partner from 3 yo. Interested in screening.    Past Medical History:   Diagnosis Date    Asthma      Past Surgical History:   Procedure Laterality Date    DILATION AND CURETTAGE OF UTERUS  2017     Social History     Socioeconomic History    Marital status: Single   Tobacco Use    Smoking status: Never    Smokeless tobacco: Never   Substance and Sexual Activity    Alcohol use: No    Drug use: No    Sexual activity: Not Currently     Partners: Male     Family History   Problem Relation Age of Onset    Breast cancer Neg Hx     Cancer Neg Hx     Ovarian cancer Neg Hx     Colon cancer Neg Hx      OB History          2    Para   1    Term   1       0    AB   1    Living   1         SAB   1    IAB   0    Ectopic   0    Multiple   0    Live Births   1                 BP (!) 118/56   Ht 5' 2" (1.575 m)   Wt 49.1 kg (108 lb 3.9 oz)   BMI 19.80 kg/m²       ROS:  GENERAL: Denies weight gain or weight loss. Feeling well overall.   SKIN: Denies rash or lesions.   HEAD: Denies head injury or headache.   NODES: Denies enlarged lymph nodes.   CHEST: Denies chest pain or shortness of breath.   CARDIOVASCULAR: Denies palpitations or left sided chest pain.   ABDOMEN: No abdominal pain, constipation, diarrhea, nausea, vomiting or rectal bleeding.   URINARY: No frequency, dysuria, hematuria, or burning on urination.  REPRODUCTIVE: See HPI.   BREASTS: The patient performs breast self-examination and denies pain, lumps, or nipple discharge.   HEMATOLOGIC: No easy bruisability or excessive bleeding.   MUSCULOSKELETAL: Denies joint pain or swelling.   NEUROLOGIC: Denies syncope or weakness.   PSYCHIATRIC: Denies depression, anxiety or mood " swings.    PHYSICAL EXAM:  APPEARANCE: Well nourished, well developed, in no acute distress.  AFFECT: WNL, alert and oriented x 3  SKIN: No acne or hirsutism  NECK: Neck symmetric without masses or thyromegaly  NODES: No inguinal, cervical, axillary, or femoral lymph node enlargement  CHEST: Good respiratory effect  ABDOMEN: Soft.  No tenderness or masses.  No hepatosplenomegaly.  No hernias.  BREASTS: Symmetrical, no skin changes or visible lesions.  No palpable masses, nipple discharge bilaterally.  PELVIC: Normal external genitalia without lesions.  Normal hair distribution.  Adequate perineal body, normal urethral meatus.  Vagina moist and well rugated without lesions or discharge.  Cervix pink, without lesions, discharge or tenderness.  No significant cystocele or rectocele.  Bimanual exam shows uterus to be normal size, regular, mobile and nontender.  Adnexa without masses or tenderness.    EXTREMITIES: No edema.    Well woman exam with routine gynecological exam  -     Liquid-Based Pap Smear, Screening  -     Progesterone; Future; Expected date: 03/26/2024  -     TSH; Future; Expected date: 03/26/2024  -     T4, FREE; Future; Expected date: 03/26/2024  -     CBC Auto Differential; Future; Expected date: 03/26/2024  -     Ferritin; Future; Expected date: 03/26/2024  -     IRON AND TIBC; Future; Expected date: 03/26/2024    Menorrhagia with regular cycle    Screen for STD (sexually transmitted disease)  -     Vaginosis Screen by DNA Probe  -     C. trachomatis/N. gonorrhoeae by AMP DNA    Vaginal discharge  -     Vaginosis Screen by DNA Probe            Patient was counseled today on A.C.S. Pap guidelines and recommendations for yearly pelvic exams, mammograms and monthly self breast exams; to see her PCP for other health maintenance.     No follow-ups on file.                  Answers submitted by the patient for this visit:  Gynecologic Exam Questionnaire  (Submitted on 3/26/2024)  Chief Complaint:  Gynecologic exam  genital itching: No  genital lesions: No  genital odor: No  genital rash: No  missed menses: No  pelvic pain: No  vaginal bleeding: No  vaginal discharge: No

## 2024-03-27 ENCOUNTER — PATIENT MESSAGE (OUTPATIENT)
Dept: OBSTETRICS AND GYNECOLOGY | Facility: CLINIC | Age: 24
End: 2024-03-27
Payer: MEDICAID

## 2024-03-27 LAB
BACTERIAL VAGINOSIS DNA: POSITIVE
C TRACH DNA SPEC QL NAA+PROBE: NOT DETECTED
CANDIDA GLABRATA DNA: NEGATIVE
CANDIDA KRUSEI DNA: NEGATIVE
CANDIDA RRNA VAG QL PROBE: NEGATIVE
N GONORRHOEA DNA SPEC QL NAA+PROBE: NOT DETECTED
T VAGINALIS RRNA GENITAL QL PROBE: POSITIVE

## 2024-03-27 RX ORDER — METRONIDAZOLE 500 MG/1
500 TABLET ORAL EVERY 12 HOURS
Qty: 14 TABLET | Refills: 1 | Status: SHIPPED | OUTPATIENT
Start: 2024-03-27 | End: 2024-04-03

## 2024-03-27 RX ORDER — ONDANSETRON 4 MG/1
8 TABLET, ORALLY DISINTEGRATING ORAL EVERY 6 HOURS PRN
Qty: 20 TABLET | Refills: 1 | Status: SHIPPED | OUTPATIENT
Start: 2024-03-27

## 2024-04-03 LAB
FINAL PATHOLOGIC DIAGNOSIS: NORMAL
Lab: NORMAL

## 2024-05-22 ENCOUNTER — OFFICE VISIT (OUTPATIENT)
Dept: URGENT CARE | Facility: CLINIC | Age: 24
End: 2024-05-22
Payer: MEDICAID

## 2024-05-22 VITALS
WEIGHT: 112 LBS | DIASTOLIC BLOOD PRESSURE: 72 MMHG | BODY MASS INDEX: 19.12 KG/M2 | RESPIRATION RATE: 16 BRPM | SYSTOLIC BLOOD PRESSURE: 106 MMHG | OXYGEN SATURATION: 99 % | TEMPERATURE: 99 F | HEART RATE: 106 BPM | HEIGHT: 64 IN

## 2024-05-22 DIAGNOSIS — J02.9 SORE THROAT: ICD-10-CM

## 2024-05-22 DIAGNOSIS — J03.00 ACUTE NON-RECURRENT STREPTOCOCCAL TONSILLITIS: Primary | ICD-10-CM

## 2024-05-22 DIAGNOSIS — R05.9 COUGH, UNSPECIFIED TYPE: ICD-10-CM

## 2024-05-22 LAB
CTP QC/QA: YES
CTP QC/QA: YES
MOLECULAR STREP A: POSITIVE
SARS-COV-2 AG RESP QL IA.RAPID: NEGATIVE

## 2024-05-22 PROCEDURE — 99214 OFFICE O/P EST MOD 30 MIN: CPT | Mod: S$GLB,,,

## 2024-05-22 PROCEDURE — 87811 SARS-COV-2 COVID19 W/OPTIC: CPT | Mod: QW,S$GLB,,

## 2024-05-22 PROCEDURE — 87651 STREP A DNA AMP PROBE: CPT | Mod: QW,S$GLB,,

## 2024-05-22 RX ORDER — AMOXICILLIN 500 MG/1
500 TABLET, FILM COATED ORAL EVERY 12 HOURS
Qty: 20 TABLET | Refills: 0 | Status: SHIPPED | OUTPATIENT
Start: 2024-05-22 | End: 2024-06-01

## 2024-05-22 RX ORDER — IBUPROFEN 600 MG/1
600 TABLET ORAL EVERY 8 HOURS PRN
Qty: 9 TABLET | Refills: 0 | Status: SHIPPED | OUTPATIENT
Start: 2024-05-22 | End: 2024-05-25

## 2024-05-22 NOTE — LETTER
May 22, 2024      [facility]  [address], [address]  [place] [address]  Phone: [phone]  Fax: [phone]       Patient: Isabela Frankel   YOB: 2000  Date of Visit: 05/22/2024    To Whom It May Concern:    Tavo Frankel  was at [facility] on 05/22/2024. The patient may return to work/school on 5/23/2024 with no restrictions. If you have any questions or concerns, or if I can be of further assistance, please do not hesitate to contact me.    Sincerely,      Bushra Gonzalez PA-C

## 2024-05-22 NOTE — PROGRESS NOTES
"Subjective:      Patient ID: Isabela Frankel is a 23 y.o. female.    Vitals:  height is 5' 4" (1.626 m) and weight is 50.8 kg (112 lb). Her oral temperature is 98.5 °F (36.9 °C). Her blood pressure is 106/72 and her pulse is 106. Her respiration is 16 and oxygen saturation is 99%.     Chief Complaint: Sinus Problem    Pt present for sinus issues that started yesterday. Pt took truflu.    Provider note starts below:    Patient presents to clinic for evaluation of sore throat which onset yesterday.  States her throat was initially "scratchy" but throughout the day yesterday it progressively worsened.  She was unable to sleep last night due to sore throat.  She tried taking TheraFlu at home which did not improve her symptoms.  Associated symptoms include pain with swallowing, headache, fatigue, generalized weakness, and cough.  Patient states her cough is likely due to trying to clear her throat.  No known sick contacts.  Patient denies fever,  nausea, vomiting, abdominal pain, ear pain, sinus pain, chest pain, palpitations, shortness for breath, wheezing, stridor.  No other complaints.    Sinus Problem  This is a new problem. The current episode started yesterday. The problem is unchanged. There has been no fever. Her pain is at a severity of 0/10. She is experiencing no pain. Associated symptoms include chills, coughing, headaches and a sore throat. Pertinent negatives include no congestion, diaphoresis, ear pain, neck pain, shortness of breath or sinus pressure. (bodyache) The treatment provided no relief.       Constitution: Positive for chills and fatigue. Negative for activity change, appetite change, sweating, fever and generalized weakness.   HENT:  Positive for sore throat and trouble swallowing. Negative for ear pain, ear discharge, congestion, postnasal drip, sinus pain, sinus pressure and voice change.    Neck: Negative for neck pain and neck stiffness.   Cardiovascular:  Negative for chest pain, leg " swelling, palpitations and sob on exertion.   Eyes:  Negative for eye discharge and eye itching.   Respiratory:  Positive for cough. Negative for chest tightness, sputum production, bloody sputum, shortness of breath, stridor and wheezing.    Gastrointestinal:  Negative for abdominal pain, nausea and vomiting.   Musculoskeletal:  Negative for muscle cramps and muscle ache.   Skin:  Negative for pale and rash.   Neurological:  Positive for headaches. Negative for dizziness, light-headedness, disorientation and altered mental status.   Psychiatric/Behavioral:  Negative for altered mental status, disorientation and confusion.       Objective:     Physical Exam   Constitutional: She is oriented to person, place, and time.  Non-toxic appearance. She does not appear ill. No distress.   HENT:   Head: Normocephalic and atraumatic.   Ears:   Right Ear: Tympanic membrane normal.   Left Ear: Tympanic membrane normal.   Nose: Nose normal.   Mouth/Throat: Mucous membranes are moist. Posterior oropharyngeal erythema present. No oropharyngeal exudate.   Eyes: Conjunctivae are normal. Extraocular movement intact   Neck: Neck supple.   Cardiovascular: Normal rate, regular rhythm, normal heart sounds and normal pulses.   Pulmonary/Chest: Effort normal and breath sounds normal. She has no wheezes. She has no rhonchi. She has no rales.   Abdominal: Normal appearance.   Musculoskeletal: Normal range of motion.         General: Normal range of motion.   Lymphadenopathy:     She has cervical adenopathy.   Neurological: She is alert, oriented to person, place, and time and at baseline.   Skin: Skin is warm and dry.   Psychiatric: Her behavior is normal. Mood normal.   Nursing note and vitals reviewed.    Assessment:     Results for orders placed or performed in visit on 05/22/24   SARS Coronavirus 2 Antigen, POCT Manual Read   Result Value Ref Range    SARS Coronavirus 2 Antigen Negative Negative     Acceptable Yes    POCT  Strep A, Molecular   Result Value Ref Range    Molecular Strep A, POC Positive (A) Negative     Acceptable Yes        1. Acute non-recurrent streptococcal tonsillitis    2. Cough, unspecified type    3. Sore throat        Plan:     Acute non-recurrent streptococcal tonsillitis  -     amoxicillin (AMOXIL) 500 MG Tab; Take 1 tablet (500 mg total) by mouth every 12 (twelve) hours. for 10 days  Dispense: 20 tablet; Refill: 0  -     ibuprofen (ADVIL,MOTRIN) 600 MG tablet; Take 1 tablet (600 mg total) by mouth every 8 (eight) hours as needed for Pain.  Dispense: 9 tablet; Refill: 0    Cough, unspecified type  -     SARS Coronavirus 2 Antigen, POCT Manual Read    Sore throat  -     POCT Strep A, Molecular            Patient Instructions   Strep throat is an infection of the throat caused by germs called group A streptococci. Strep throat is not the same as just a sore throat. It may be much worse. With strep throat, your doctor may need to treat the infection with drugs. You may start to feel better 1 to 2 days after you start your drugs.    Medications  Amoxicillin twice daily for 10 days. Please take to completion, even if you start feeling better. Take with food to avoid GI upset.  Ibuprofen 600 mg every 6-8 hours as needed for pain.    Care at home  Throw away your toothbrush or sterilize it after 48 hours of being on antibiotics.  Gargle with warm salt water a few times daily. Mix 1/2 teaspoon (2.5 grams) salt with a cup (240 mL) of warm water.  Use a cool mist humidifier to keep your throat moist.  Drink lots of water, juice, or broth.  Suck on ice chips or throat lozenges to ease pain.  Stop smoking. Talk to your doctor if you need help quitting. Stay away from those who are smoking.  If your throat feels too sore to eat solid foods, you may drink juice, milk, milkshakes, or soups.  Do not drink sports drinks, soft drinks, undiluted fruit juice, or beverages that have too much sugar. These may cause  fluid loss and throat itchiness.  Avoid caffeine, acidic juices like orange juice or lemonade, and beer, wine, and mixed drinks (alcohol). These can worsen your signs.    When do I need to call the doctor?   Signs of infection. These include a fever of 100.4°F (38°C) or higher, chills, very bad sore throat, ear or sinus pain, cough, more sputum or change in color of sputum.  Fast heartbeat  Very tired  Trouble drinking and eating soups and soft foods  Changes in the color of your urine    Should you develop any worsening or new symptoms after leaving urgent care, it is recommended that you go to the ER for further/repeat evaluation.      Follow up with your PCP in 3-5 days after your urgent care visit.     Please remember that you have received care at an urgent care today. Urgent cares are not emergency rooms and are not equipped to handle life threatening emergencies and cannot rule in or out certain medical conditions and you may be released before all of your medical problems are known or treated, please schedule all follow up appointments as discussed and if you have worsening symptoms please go to the ER to rule out potential life threatening problems, as discussed.

## 2025-08-19 ENCOUNTER — LAB VISIT (OUTPATIENT)
Dept: LAB | Facility: HOSPITAL | Age: 25
End: 2025-08-19
Payer: COMMERCIAL

## 2025-08-19 ENCOUNTER — OFFICE VISIT (OUTPATIENT)
Dept: INTERNAL MEDICINE | Facility: CLINIC | Age: 25
End: 2025-08-19
Payer: COMMERCIAL

## 2025-08-19 VITALS
HEIGHT: 62 IN | WEIGHT: 141.75 LBS | HEART RATE: 84 BPM | BODY MASS INDEX: 26.09 KG/M2 | DIASTOLIC BLOOD PRESSURE: 88 MMHG | SYSTOLIC BLOOD PRESSURE: 125 MMHG | OXYGEN SATURATION: 99 %

## 2025-08-19 DIAGNOSIS — Z00.00 WELLNESS EXAMINATION: Primary | ICD-10-CM

## 2025-08-19 DIAGNOSIS — Z00.00 WELLNESS EXAMINATION: ICD-10-CM

## 2025-08-19 LAB
ABSOLUTE EOSINOPHIL (OHS): 0.15 K/UL
ABSOLUTE MONOCYTE (OHS): 0.35 K/UL (ref 0.3–1)
ABSOLUTE NEUTROPHIL COUNT (OHS): 2.14 K/UL (ref 1.8–7.7)
ALBUMIN SERPL BCP-MCNC: 4.1 G/DL (ref 3.5–5.2)
ALP SERPL-CCNC: 85 UNIT/L (ref 40–150)
ALT SERPL W/O P-5'-P-CCNC: 44 UNIT/L (ref 0–55)
ANION GAP (OHS): 9 MMOL/L (ref 8–16)
AST SERPL-CCNC: 39 UNIT/L (ref 0–50)
BASOPHILS # BLD AUTO: 0.04 K/UL
BASOPHILS NFR BLD AUTO: 0.8 %
BILIRUB SERPL-MCNC: 0.2 MG/DL (ref 0.1–1)
BUN SERPL-MCNC: 15 MG/DL (ref 6–20)
CALCIUM SERPL-MCNC: 9.6 MG/DL (ref 8.7–10.5)
CHLORIDE SERPL-SCNC: 104 MMOL/L (ref 95–110)
CHOLEST SERPL-MCNC: 188 MG/DL (ref 120–199)
CHOLEST/HDLC SERPL: 3.2 {RATIO} (ref 2–5)
CO2 SERPL-SCNC: 22 MMOL/L (ref 23–29)
CREAT SERPL-MCNC: 0.7 MG/DL (ref 0.5–1.4)
EAG (OHS): 105 MG/DL (ref 68–131)
ERYTHROCYTE [DISTWIDTH] IN BLOOD BY AUTOMATED COUNT: 14.3 % (ref 11.5–14.5)
GFR SERPLBLD CREATININE-BSD FMLA CKD-EPI: >60 ML/MIN/1.73/M2
GLUCOSE SERPL-MCNC: 86 MG/DL (ref 70–110)
HBA1C MFR BLD: 5.3 % (ref 4–5.6)
HCT VFR BLD AUTO: 39.1 % (ref 37–48.5)
HDLC SERPL-MCNC: 59 MG/DL (ref 40–75)
HDLC SERPL: 31.4 % (ref 20–50)
HGB BLD-MCNC: 12.5 GM/DL (ref 12–16)
IMM GRANULOCYTES # BLD AUTO: 0.01 K/UL (ref 0–0.04)
IMM GRANULOCYTES NFR BLD AUTO: 0.2 % (ref 0–0.5)
LDLC SERPL CALC-MCNC: 117.8 MG/DL (ref 63–159)
LYMPHOCYTES # BLD AUTO: 2.07 K/UL (ref 1–4.8)
MCH RBC QN AUTO: 28.6 PG (ref 27–31)
MCHC RBC AUTO-ENTMCNC: 32 G/DL (ref 32–36)
MCV RBC AUTO: 90 FL (ref 82–98)
NONHDLC SERPL-MCNC: 129 MG/DL
NUCLEATED RBC (/100WBC) (OHS): 0 /100 WBC
PLATELET # BLD AUTO: 155 K/UL (ref 150–450)
PMV BLD AUTO: 11.1 FL (ref 9.2–12.9)
POTASSIUM SERPL-SCNC: 4.1 MMOL/L (ref 3.5–5.1)
PROT SERPL-MCNC: 7.5 GM/DL (ref 6–8.4)
RBC # BLD AUTO: 4.37 M/UL (ref 4–5.4)
RELATIVE EOSINOPHIL (OHS): 3.2 %
RELATIVE LYMPHOCYTE (OHS): 43.5 % (ref 18–48)
RELATIVE MONOCYTE (OHS): 7.4 % (ref 4–15)
RELATIVE NEUTROPHIL (OHS): 44.9 % (ref 38–73)
SODIUM SERPL-SCNC: 135 MMOL/L (ref 136–145)
TRIGL SERPL-MCNC: 56 MG/DL (ref 30–150)
TSH SERPL-ACNC: 1 UIU/ML (ref 0.4–4)
WBC # BLD AUTO: 4.76 K/UL (ref 3.9–12.7)

## 2025-08-19 PROCEDURE — 99999 PR PBB SHADOW E&M-EST. PATIENT-LVL IV: CPT | Mod: PBBFAC,,,

## 2025-08-19 PROCEDURE — 3079F DIAST BP 80-89 MM HG: CPT | Mod: CPTII,S$GLB,, | Performed by: INTERNAL MEDICINE

## 2025-08-19 PROCEDURE — 36415 COLL VENOUS BLD VENIPUNCTURE: CPT

## 2025-08-19 PROCEDURE — 99385 PREV VISIT NEW AGE 18-39: CPT | Mod: S$GLB,,, | Performed by: INTERNAL MEDICINE

## 2025-08-19 PROCEDURE — 82040 ASSAY OF SERUM ALBUMIN: CPT

## 2025-08-19 PROCEDURE — 1159F MED LIST DOCD IN RCRD: CPT | Mod: CPTII,S$GLB,, | Performed by: INTERNAL MEDICINE

## 2025-08-19 PROCEDURE — 83036 HEMOGLOBIN GLYCOSYLATED A1C: CPT

## 2025-08-19 PROCEDURE — 1160F RVW MEDS BY RX/DR IN RCRD: CPT | Mod: CPTII,S$GLB,, | Performed by: INTERNAL MEDICINE

## 2025-08-19 PROCEDURE — 3044F HG A1C LEVEL LT 7.0%: CPT | Mod: CPTII,S$GLB,, | Performed by: INTERNAL MEDICINE

## 2025-08-19 PROCEDURE — 3074F SYST BP LT 130 MM HG: CPT | Mod: CPTII,S$GLB,, | Performed by: INTERNAL MEDICINE

## 2025-08-19 PROCEDURE — 85025 COMPLETE CBC W/AUTO DIFF WBC: CPT

## 2025-08-19 PROCEDURE — 3008F BODY MASS INDEX DOCD: CPT | Mod: CPTII,S$GLB,, | Performed by: INTERNAL MEDICINE

## 2025-08-19 PROCEDURE — 83718 ASSAY OF LIPOPROTEIN: CPT

## 2025-08-19 PROCEDURE — 84443 ASSAY THYROID STIM HORMONE: CPT
